# Patient Record
Sex: FEMALE | Race: WHITE | Employment: FULL TIME | URBAN - METROPOLITAN AREA
[De-identification: names, ages, dates, MRNs, and addresses within clinical notes are randomized per-mention and may not be internally consistent; named-entity substitution may affect disease eponyms.]

---

## 2017-05-13 ENCOUNTER — APPOINTMENT (OUTPATIENT)
Dept: LAB | Age: 52
End: 2017-05-13
Attending: INTERNAL MEDICINE
Payer: COMMERCIAL

## 2017-05-13 DIAGNOSIS — E03.9 HYPOTHYROIDISM, UNSPECIFIED TYPE: ICD-10-CM

## 2017-05-13 DIAGNOSIS — E56.9 VITAMIN DEFICIENCY: ICD-10-CM

## 2017-05-13 PROCEDURE — 84443 ASSAY THYROID STIM HORMONE: CPT | Performed by: INTERNAL MEDICINE

## 2017-05-13 PROCEDURE — 84439 ASSAY OF FREE THYROXINE: CPT | Performed by: INTERNAL MEDICINE

## 2017-05-13 PROCEDURE — 82306 VITAMIN D 25 HYDROXY: CPT | Performed by: INTERNAL MEDICINE

## 2017-05-13 PROCEDURE — 36415 COLL VENOUS BLD VENIPUNCTURE: CPT | Performed by: INTERNAL MEDICINE

## 2017-06-01 ENCOUNTER — TELEPHONE (OUTPATIENT)
Dept: FAMILY MEDICINE CLINIC | Facility: CLINIC | Age: 52
End: 2017-06-01

## 2017-06-02 NOTE — TELEPHONE ENCOUNTER
Pt called back returning a call for the nurse to discuss her lab test results. Please call and advise.

## 2017-10-10 ENCOUNTER — TELEPHONE (OUTPATIENT)
Dept: FAMILY MEDICINE CLINIC | Facility: CLINIC | Age: 52
End: 2017-10-10

## 2017-10-10 DIAGNOSIS — Z12.39 SCREENING BREAST EXAMINATION: Primary | ICD-10-CM

## 2017-10-10 NOTE — TELEPHONE ENCOUNTER
Wants mamm order put in system before physical appt, lives near [facility] and wants to get everything done the same day, please call

## 2017-10-11 ENCOUNTER — TELEPHONE (OUTPATIENT)
Dept: FAMILY MEDICINE CLINIC | Facility: CLINIC | Age: 52
End: 2017-10-11

## 2017-10-11 DIAGNOSIS — R73.09 ELEVATED HEMOGLOBIN A1C: ICD-10-CM

## 2017-10-11 DIAGNOSIS — E78.2 ELEVATED TRIGLYCERIDES WITH HIGH CHOLESTEROL: Primary | ICD-10-CM

## 2017-10-11 NOTE — TELEPHONE ENCOUNTER
Spoke to patient about labs. Informed her to take fish oil 1000mg BID. Watch carbs and increase exercise. Repeat labs in 8 weeks. Patient stated understanding and will follow plan. Ordered placed for repeat labs.

## 2017-12-30 ENCOUNTER — APPOINTMENT (OUTPATIENT)
Dept: LAB | Age: 52
End: 2017-12-30
Attending: FAMILY MEDICINE
Payer: COMMERCIAL

## 2017-12-30 ENCOUNTER — HOSPITAL ENCOUNTER (OUTPATIENT)
Dept: MAMMOGRAPHY | Age: 52
Discharge: HOME | End: 2017-12-30
Attending: FAMILY MEDICINE
Payer: COMMERCIAL

## 2017-12-30 ENCOUNTER — OFFICE VISIT (OUTPATIENT)
Dept: FAMILY MEDICINE CLINIC | Facility: CLINIC | Age: 52
End: 2017-12-30

## 2017-12-30 VITALS
HEART RATE: 78 BPM | WEIGHT: 128 LBS | BODY MASS INDEX: 25.8 KG/M2 | TEMPERATURE: 98 F | DIASTOLIC BLOOD PRESSURE: 82 MMHG | OXYGEN SATURATION: 98 % | HEIGHT: 59 IN | SYSTOLIC BLOOD PRESSURE: 124 MMHG | RESPIRATION RATE: 18 BRPM

## 2017-12-30 DIAGNOSIS — Z12.39 SCREENING BREAST EXAMINATION: ICD-10-CM

## 2017-12-30 DIAGNOSIS — Z78.0 POST-MENOPAUSAL: ICD-10-CM

## 2017-12-30 DIAGNOSIS — K51.919 ULCERATIVE COLITIS WITH COMPLICATION, UNSPECIFIED LOCATION (HCC): ICD-10-CM

## 2017-12-30 DIAGNOSIS — E78.2 ELEVATED TRIGLYCERIDES WITH HIGH CHOLESTEROL: ICD-10-CM

## 2017-12-30 DIAGNOSIS — Z00.00 ANNUAL PHYSICAL EXAM: Primary | ICD-10-CM

## 2017-12-30 DIAGNOSIS — N88.9 ABNORMAL CERVIX FINDING: ICD-10-CM

## 2017-12-30 DIAGNOSIS — E78.2 MIXED HYPERLIPIDEMIA: ICD-10-CM

## 2017-12-30 DIAGNOSIS — Z01.419 WELL WOMAN EXAM WITH ROUTINE GYNECOLOGICAL EXAM: ICD-10-CM

## 2017-12-30 DIAGNOSIS — R73.03 PREDIABETES: ICD-10-CM

## 2017-12-30 DIAGNOSIS — R73.09 ELEVATED HEMOGLOBIN A1C: ICD-10-CM

## 2017-12-30 DIAGNOSIS — D22.39 MELANOCYTIC NEVUS OF FACE, OTHER LOCATION: ICD-10-CM

## 2017-12-30 LAB
ALBUMIN SERPL-MCNC: 3.7 G/DL (ref 3.5–4.8)
ALP LIVER SERPL-CCNC: 86 U/L (ref 41–108)
ALT SERPL-CCNC: 15 U/L (ref 14–54)
AST SERPL-CCNC: 16 U/L (ref 15–41)
BILIRUB SERPL-MCNC: 0.3 MG/DL (ref 0.1–2)
BUN BLD-MCNC: 20 MG/DL (ref 8–20)
CALCIUM BLD-MCNC: 9.1 MG/DL (ref 8.3–10.3)
CHLORIDE: 107 MMOL/L (ref 101–111)
CHOLEST SMN-MCNC: 242 MG/DL (ref ?–200)
CO2: 29 MMOL/L (ref 22–32)
CREAT BLD-MCNC: 0.72 MG/DL (ref 0.55–1.02)
EST. AVERAGE GLUCOSE BLD GHB EST-MCNC: 120 MG/DL (ref 68–126)
GLUCOSE BLD-MCNC: 89 MG/DL (ref 70–99)
HAV IGM SER QL: 2.3 MG/DL (ref 1.7–3)
HBA1C MFR BLD HPLC: 5.8 % (ref ?–5.7)
HDLC SERPL-MCNC: 49 MG/DL (ref 45–?)
HDLC SERPL: 4.94 {RATIO} (ref ?–4.44)
LDLC SERPL CALC-MCNC: 167 MG/DL (ref ?–130)
M PROTEIN MFR SERPL ELPH: 7.5 G/DL (ref 6.1–8.3)
NONHDLC SERPL-MCNC: 193 MG/DL (ref ?–130)
POTASSIUM SERPL-SCNC: 3.9 MMOL/L (ref 3.6–5.1)
SODIUM SERPL-SCNC: 144 MMOL/L (ref 136–144)
TRIGL SERPL-MCNC: 130 MG/DL (ref ?–150)
VLDLC SERPL CALC-MCNC: 26 MG/DL (ref 5–40)

## 2017-12-30 PROCEDURE — 87624 HPV HI-RISK TYP POOLED RSLT: CPT | Performed by: FAMILY MEDICINE

## 2017-12-30 PROCEDURE — 99396 PREV VISIT EST AGE 40-64: CPT | Performed by: FAMILY MEDICINE

## 2017-12-30 PROCEDURE — 99212 OFFICE O/P EST SF 10 MIN: CPT | Performed by: FAMILY MEDICINE

## 2017-12-30 PROCEDURE — 83735 ASSAY OF MAGNESIUM: CPT | Performed by: FAMILY MEDICINE

## 2017-12-30 PROCEDURE — 77063 BREAST TOMOSYNTHESIS BI: CPT | Performed by: FAMILY MEDICINE

## 2017-12-30 PROCEDURE — 83036 HEMOGLOBIN GLYCOSYLATED A1C: CPT | Performed by: FAMILY MEDICINE

## 2017-12-30 PROCEDURE — 80061 LIPID PANEL: CPT | Performed by: FAMILY MEDICINE

## 2017-12-30 PROCEDURE — 80053 COMPREHEN METABOLIC PANEL: CPT | Performed by: FAMILY MEDICINE

## 2017-12-30 PROCEDURE — 88175 CYTOPATH C/V AUTO FLUID REDO: CPT | Performed by: FAMILY MEDICINE

## 2017-12-30 PROCEDURE — 77067 SCR MAMMO BI INCL CAD: CPT | Performed by: FAMILY MEDICINE

## 2017-12-30 PROCEDURE — 36415 COLL VENOUS BLD VENIPUNCTURE: CPT | Performed by: FAMILY MEDICINE

## 2017-12-30 RX ORDER — CHLORAL HYDRATE 500 MG
1000 CAPSULE ORAL DAILY
COMMUNITY
End: 2021-06-10 | Stop reason: ALTCHOICE

## 2018-01-02 LAB — HPV I/H RISK 1 DNA SPEC QL NAA+PROBE: NEGATIVE

## 2018-01-10 NOTE — H&P
Henry Ghotra is a 46year old female who presents for a well woman physical exam:       Patient complains of:  Nevus on face. Blood test showing prediabetes, and hyperlipidemia. Hx of UC. Not currently with diarrhea or hematochezia.       Cur up to date    Osteoperosis Prevention was discussed. Reviewed Calcium, Vitamin D supplementation and Weight Bearing Exercises. Patient is not currently taking calcium and Vitamin D supplementation.         REVIEW OF SYSTEMS:   GENERAL: feels well otherw 3    4. Post-menopausal  - XR DEXA BONE DENSITOMETRY (CPT=77080); Future    5. Ulcerative colitis with complication, unspecified location ([facility] [address]  - MAGNESIUM; Future    6. Melanocytic nevus of face, other location  - DERM - INTERNAL    7.  Abnormal cervix fi

## 2018-02-06 ENCOUNTER — TELEPHONE (OUTPATIENT)
Dept: OBGYN CLINIC | Facility: CLINIC | Age: 53
End: 2018-02-06

## 2018-02-06 NOTE — TELEPHONE ENCOUNTER
Its not because of the pap smear--that is normal.  Dr. Racquel Girard thinks something is on the cervix.   Just needs new pt appt

## 2018-02-06 NOTE — TELEPHONE ENCOUNTER
She was a pt of yours at [facility] for health. Her pcp wants her to see ob/gyne because of her pap result. Please review  result and let PSR know what type of appt she needs.   Thanks

## 2018-02-06 NOTE — TELEPHONE ENCOUNTER
Advised pt on her previously schedule appt with Dr. Chidi Tamayo on 2/16/18. Pt states she forgot she had that. Will keep that appt.

## 2018-02-16 ENCOUNTER — OFFICE VISIT (OUTPATIENT)
Dept: OBGYN CLINIC | Facility: CLINIC | Age: 53
End: 2018-02-16

## 2018-02-16 VITALS
WEIGHT: 124 LBS | SYSTOLIC BLOOD PRESSURE: 116 MMHG | HEIGHT: 60.25 IN | BODY MASS INDEX: 24.03 KG/M2 | DIASTOLIC BLOOD PRESSURE: 68 MMHG | HEART RATE: 73 BPM

## 2018-02-16 DIAGNOSIS — D26.0: Primary | ICD-10-CM

## 2018-02-16 PROCEDURE — 99203 OFFICE O/P NEW LOW 30 MIN: CPT | Performed by: OBSTETRICS & GYNECOLOGY

## 2018-02-16 NOTE — PROGRESS NOTES
Menopausal fo 'many years'    PCP found lesion on cervix, normal PAP 17    ROS: No Cardiac, Respiratory, GI,  or Neurological symptoms.     PE:  Abdomen soft, no masses, non-tender  Pelvic:External vag normal, cervix endocervical polyp covers os

## 2019-10-25 ENCOUNTER — TELEPHONE (OUTPATIENT)
Dept: FAMILY MEDICINE CLINIC | Facility: CLINIC | Age: 54
End: 2019-10-25

## 2019-10-25 NOTE — TELEPHONE ENCOUNTER
Was in ER today and had an EKG and it came out Abnormal. ( She moved to [place])    Is there a way to look and see if she ever had and EKG here? She wants us to fax it to the hospital that she is at right now.  ([address] )    Bassem Lmyan

## 2021-06-10 ENCOUNTER — OFFICE VISIT (OUTPATIENT)
Dept: FAMILY MEDICINE CLINIC | Facility: CLINIC | Age: 56
End: 2021-06-10
Payer: COMMERCIAL

## 2021-06-10 VITALS
OXYGEN SATURATION: 99 % | BODY MASS INDEX: 25.52 KG/M2 | HEIGHT: 60 IN | HEART RATE: 78 BPM | RESPIRATION RATE: 18 BRPM | TEMPERATURE: 98 F | WEIGHT: 130 LBS | DIASTOLIC BLOOD PRESSURE: 76 MMHG | SYSTOLIC BLOOD PRESSURE: 124 MMHG

## 2021-06-10 DIAGNOSIS — Z00.00 ROUTINE GENERAL MEDICAL EXAMINATION AT A HEALTH CARE FACILITY: Primary | ICD-10-CM

## 2021-06-10 DIAGNOSIS — N89.8 VAGINAL DISCHARGE: ICD-10-CM

## 2021-06-10 DIAGNOSIS — Z12.4 CERVICAL CANCER SCREENING: ICD-10-CM

## 2021-06-10 DIAGNOSIS — Z13.1 SCREENING FOR DIABETES MELLITUS (DM): ICD-10-CM

## 2021-06-10 DIAGNOSIS — K51.80 OTHER ULCERATIVE COLITIS WITHOUT COMPLICATION (HCC): ICD-10-CM

## 2021-06-10 DIAGNOSIS — M79.89 LEG SWELLING: ICD-10-CM

## 2021-06-10 DIAGNOSIS — Z80.3 FAMILY HISTORY OF BREAST CANCER IN MOTHER: ICD-10-CM

## 2021-06-10 PROCEDURE — 3008F BODY MASS INDEX DOCD: CPT | Performed by: PHYSICIAN ASSISTANT

## 2021-06-10 PROCEDURE — 88175 CYTOPATH C/V AUTO FLUID REDO: CPT | Performed by: PHYSICIAN ASSISTANT

## 2021-06-10 PROCEDURE — 3074F SYST BP LT 130 MM HG: CPT | Performed by: PHYSICIAN ASSISTANT

## 2021-06-10 PROCEDURE — 87624 HPV HI-RISK TYP POOLED RSLT: CPT | Performed by: PHYSICIAN ASSISTANT

## 2021-06-10 PROCEDURE — 99386 PREV VISIT NEW AGE 40-64: CPT | Performed by: PHYSICIAN ASSISTANT

## 2021-06-10 PROCEDURE — 3078F DIAST BP <80 MM HG: CPT | Performed by: PHYSICIAN ASSISTANT

## 2021-06-10 RX ORDER — CLOTRIMAZOLE AND BETAMETHASONE DIPROPIONATE 10; .64 MG/G; MG/G
1 CREAM TOPICAL 2 TIMES DAILY PRN
Qty: 60 G | Refills: 3 | Status: SHIPPED | OUTPATIENT
Start: 2021-06-10

## 2021-06-10 NOTE — PROGRESS NOTES
HPI/Subjective:   Patient ID: Alden Love is a 54year old female. HPI   Patient presents today requesting physical exam. She is a patient of Dr. Sergo Kendrick but for the last 3 years was living in [place]. She recently moved back a month ago. nervous/anxious. Current Outpatient Medications   Medication Sig Dispense Refill   • clotrimazole-betamethasone 1-0.05 % External Cream Apply 1 Application topically 2 (two) times daily as needed.  60 g 3     Allergies:No Known Allergies    Objective: motion and neck supple. Lymphadenopathy:      Cervical: No cervical adenopathy. Skin:     General: Skin is warm and dry. Neurological:      General: No focal deficit present. Mental Status: She is alert and oriented to person, place, and time. times daily as needed.        Imaging & Referrals:  None

## 2021-06-18 ENCOUNTER — TELEPHONE (OUTPATIENT)
Dept: FAMILY MEDICINE CLINIC | Facility: CLINIC | Age: 56
End: 2021-06-18

## 2021-06-18 DIAGNOSIS — M25.40 SWELLING OF JOINT: Primary | ICD-10-CM

## 2021-06-18 NOTE — TELEPHONE ENCOUNTER
I have put in orders to check for autoimmune disorders such as RA/lupus. She should elevate the lower extremity to help reduce swelling. We should avoid NSAIDs given her history of ulcerative colitis. She can take Tylenol as needed for achiness or pain.

## 2021-06-18 NOTE — TELEPHONE ENCOUNTER
Right foot:   Feels cold, she can feel sensation when she touches foot, some feeling of numbness when not touching, foot is not pale/blue, describes as redness on bottom, blanches, cap refill is right away.    She states the right leg, right arm swelling, i

## 2021-06-18 NOTE — TELEPHONE ENCOUNTER
Patient was seen on 6/10 and was discussed that her right side arm, leg, ankles were swollen. Patient now states that yesterday foot felt achy and today feels cold and is still swollen. Please advise.

## 2021-06-18 NOTE — TELEPHONE ENCOUNTER
Patient notified, verbalized understanding.    States she is taking Tylenol, elevating legs, been tested for Lupus in the past. Signed a verbal release for Dr. Marlo Goltz [place] [id number] [facility] to send all records, have not been received she signed a medical release when she was

## 2021-06-19 ENCOUNTER — LAB ENCOUNTER (OUTPATIENT)
Dept: LAB | Facility: HOSPITAL | Age: 56
End: 2021-06-19
Attending: PHYSICIAN ASSISTANT
Payer: COMMERCIAL

## 2021-06-19 DIAGNOSIS — Z00.00 ROUTINE GENERAL MEDICAL EXAMINATION AT A HEALTH CARE FACILITY: ICD-10-CM

## 2021-06-19 DIAGNOSIS — M79.89 LEG SWELLING: ICD-10-CM

## 2021-06-19 DIAGNOSIS — M25.40 SWELLING OF JOINT: ICD-10-CM

## 2021-06-19 DIAGNOSIS — Z13.1 SCREENING FOR DIABETES MELLITUS (DM): ICD-10-CM

## 2021-06-19 PROCEDURE — 36415 COLL VENOUS BLD VENIPUNCTURE: CPT

## 2021-06-19 PROCEDURE — 80053 COMPREHEN METABOLIC PANEL: CPT

## 2021-06-19 PROCEDURE — 83036 HEMOGLOBIN GLYCOSYLATED A1C: CPT

## 2021-06-19 PROCEDURE — 85025 COMPLETE CBC W/AUTO DIFF WBC: CPT

## 2021-06-19 PROCEDURE — 80061 LIPID PANEL: CPT

## 2021-06-19 PROCEDURE — 82306 VITAMIN D 25 HYDROXY: CPT

## 2021-06-19 PROCEDURE — 82607 VITAMIN B-12: CPT

## 2021-06-19 PROCEDURE — 84443 ASSAY THYROID STIM HORMONE: CPT

## 2021-06-19 PROCEDURE — 86431 RHEUMATOID FACTOR QUANT: CPT

## 2021-06-19 PROCEDURE — 86038 ANTINUCLEAR ANTIBODIES: CPT

## 2021-06-19 PROCEDURE — 86140 C-REACTIVE PROTEIN: CPT

## 2021-06-19 PROCEDURE — 85379 FIBRIN DEGRADATION QUANT: CPT

## 2021-06-23 ENCOUNTER — TELEPHONE (OUTPATIENT)
Dept: FAMILY MEDICINE CLINIC | Facility: CLINIC | Age: 56
End: 2021-06-23

## 2021-06-23 NOTE — TELEPHONE ENCOUNTER
We can hold off on starting a statin at this point. If she had abnormal CT cardiac calcium score then that is good news. Her ASCVD 10-year risk score is 3%.   We will continue to monitor lipids for now but she should try to maintain a heart healthy low-fa

## 2021-06-23 NOTE — TELEPHONE ENCOUNTER
Reviewed results note from [facility] with patient,   She states she saw cards in [place] and they advised statin not needed after CT calcium score was reviewed, this was 6 months ago (I was able to find records release paperwork signed by patient on 6/10/21, faxe

## 2021-06-23 NOTE — TELEPHONE ENCOUNTER
Pt has viewed her lab results and has some concerns she would like to discuss with nurse. She also wants to discuss her foot problem and since last visit it has worsened and \"is very cold\".   Pt also frustrated that we have not received records from prev

## 2021-07-29 ENCOUNTER — OFFICE VISIT (OUTPATIENT)
Dept: FAMILY MEDICINE CLINIC | Facility: CLINIC | Age: 56
End: 2021-07-29
Payer: COMMERCIAL

## 2021-07-29 VITALS
SYSTOLIC BLOOD PRESSURE: 122 MMHG | OXYGEN SATURATION: 98 % | TEMPERATURE: 97 F | RESPIRATION RATE: 20 BRPM | DIASTOLIC BLOOD PRESSURE: 80 MMHG | HEIGHT: 60 IN | BODY MASS INDEX: 25.72 KG/M2 | HEART RATE: 65 BPM | WEIGHT: 131 LBS

## 2021-07-29 DIAGNOSIS — R25.2 LEG CRAMP: ICD-10-CM

## 2021-07-29 DIAGNOSIS — I73.00 RAYNAUD'S DISEASE WITHOUT GANGRENE: Primary | ICD-10-CM

## 2021-07-29 DIAGNOSIS — E78.2 MIXED HYPERLIPIDEMIA: ICD-10-CM

## 2021-07-29 PROCEDURE — 3008F BODY MASS INDEX DOCD: CPT | Performed by: FAMILY MEDICINE

## 2021-07-29 PROCEDURE — 3074F SYST BP LT 130 MM HG: CPT | Performed by: FAMILY MEDICINE

## 2021-07-29 PROCEDURE — 99214 OFFICE O/P EST MOD 30 MIN: CPT | Performed by: FAMILY MEDICINE

## 2021-07-29 PROCEDURE — 3079F DIAST BP 80-89 MM HG: CPT | Performed by: FAMILY MEDICINE

## 2021-07-29 RX ORDER — ATORVASTATIN CALCIUM 20 MG/1
20 TABLET, FILM COATED ORAL NIGHTLY
Qty: 90 TABLET | Refills: 1 | Status: SHIPPED | OUTPATIENT
Start: 2021-07-29

## 2021-07-29 RX ORDER — ASPIRIN 81 MG/1
81 TABLET ORAL DAILY
Qty: 90 TABLET | Refills: 1 | Status: SHIPPED | OUTPATIENT
Start: 2021-07-29

## 2021-07-29 RX ORDER — NIFEDIPINE 30 MG/1
30 TABLET, FILM COATED, EXTENDED RELEASE ORAL DAILY
Qty: 90 TABLET | Refills: 1 | Status: SHIPPED | OUTPATIENT
Start: 2021-07-29

## 2021-07-30 NOTE — PROGRESS NOTES
HPI/Subjective:   Patient ID: Sabiha Almanza is a 54year old female. For about 1 year has been having cold sensation, swelling, and intermittent numbness and mild pain in right arm and right foot. Worsening. No treatment tried no relief.   Otilia Jaquez Pulmonary:      Effort: Pulmonary effort is normal. No respiratory distress. Breath sounds: Normal breath sounds. No wheezing or rales. Musculoskeletal:         General: No tenderness.    Skin:     Comments: extremities do not feel colder on right

## 2021-08-03 ENCOUNTER — TELEPHONE (OUTPATIENT)
Dept: FAMILY MEDICINE CLINIC | Facility: CLINIC | Age: 56
End: 2021-08-03

## 2021-08-03 NOTE — TELEPHONE ENCOUNTER
Patient was seen last week and Dr. Davonna Schirmer had prescribed 3 medications. NIFEdipine ER 30 MG Oral Tablet 24 Hr  atorvastatin 20 MG Oral Tab  aspirin 81 MG Oral Tab EC    She states that she takes all 3 at the same time.   She believes that the Nifedipine i

## 2021-08-03 NOTE — TELEPHONE ENCOUNTER
Spoke with patient:   She states she has taken the Nifedipine for 3 days, she feels loopy after taking medication and has had a migraine each day.     Last night she went to bed at 6pm with migraine, did not take anything for pain, just sleep, doing better

## 2021-08-04 RX ORDER — AMLODIPINE BESYLATE 2.5 MG/1
2.5 TABLET ORAL DAILY
Qty: 30 TABLET | Refills: 0 | Status: SHIPPED | OUTPATIENT
Start: 2021-08-04

## 2021-11-22 ENCOUNTER — NURSE ONLY (OUTPATIENT)
Dept: FAMILY MEDICINE CLINIC | Facility: CLINIC | Age: 56
End: 2021-11-22
Payer: COMMERCIAL

## 2021-11-22 PROCEDURE — 90471 IMMUNIZATION ADMIN: CPT | Performed by: FAMILY MEDICINE

## 2021-11-22 PROCEDURE — 90686 IIV4 VACC NO PRSV 0.5 ML IM: CPT | Performed by: FAMILY MEDICINE

## 2021-11-30 ENCOUNTER — TELEPHONE (OUTPATIENT)
Dept: FAMILY MEDICINE CLINIC | Facility: CLINIC | Age: 56
End: 2021-11-30

## 2021-11-30 DIAGNOSIS — Z12.31 ENCOUNTER FOR SCREENING MAMMOGRAM FOR BREAST CANCER: Primary | ICD-10-CM

## 2022-01-29 ENCOUNTER — HOSPITAL ENCOUNTER (OUTPATIENT)
Dept: MAMMOGRAPHY | Age: 57
Discharge: HOME | End: 2022-01-29
Attending: PHYSICIAN ASSISTANT
Payer: COMMERCIAL

## 2022-01-29 ENCOUNTER — MOBILE ENCOUNTER (OUTPATIENT)
Dept: FAMILY MEDICINE CLINIC | Facility: CLINIC | Age: 57
End: 2022-01-29

## 2022-01-29 DIAGNOSIS — Z12.31 ENCOUNTER FOR SCREENING MAMMOGRAM FOR BREAST CANCER: ICD-10-CM

## 2022-01-30 NOTE — PROGRESS NOTES
Home phone call received at 11:45 PM on January 29, 2022. Patient received Covid booster this morning. Is having arm pain at the injection site, abdominal discomfort, myalgias, and headache all over. She does have a history of colitis.   We discussed the

## 2022-02-26 ENCOUNTER — HOSPITAL ENCOUNTER (OUTPATIENT)
Dept: MAMMOGRAPHY | Age: 57
Discharge: HOME | End: 2022-02-26
Attending: PHYSICIAN ASSISTANT
Payer: COMMERCIAL

## 2022-02-26 DIAGNOSIS — Z12.31 ENCOUNTER FOR SCREENING MAMMOGRAM FOR BREAST CANCER: ICD-10-CM

## 2022-03-16 ENCOUNTER — APPOINTMENT (OUTPATIENT)
Dept: MRI IMAGING | Facility: HOSPITAL | Age: 57
End: 2022-03-16
Attending: STUDENT IN AN ORGANIZED HEALTH CARE EDUCATION/TRAINING PROGRAM
Payer: COMMERCIAL

## 2022-03-16 ENCOUNTER — HOSPITAL ENCOUNTER (OUTPATIENT)
Age: 57
Discharge: ANOTHER HOSPITAL | End: 2022-03-16
Attending: EMERGENCY MEDICINE
Payer: COMMERCIAL

## 2022-03-16 ENCOUNTER — HOSPITAL ENCOUNTER (EMERGENCY)
Facility: HOSPITAL | Age: 57
Discharge: HOME | End: 2022-03-16
Attending: STUDENT IN AN ORGANIZED HEALTH CARE EDUCATION/TRAINING PROGRAM
Payer: COMMERCIAL

## 2022-03-16 VITALS
TEMPERATURE: 98 F | HEART RATE: 84 BPM | HEIGHT: 60 IN | DIASTOLIC BLOOD PRESSURE: 71 MMHG | SYSTOLIC BLOOD PRESSURE: 117 MMHG | OXYGEN SATURATION: 99 % | RESPIRATION RATE: 18 BRPM | WEIGHT: 145 LBS | BODY MASS INDEX: 28.47 KG/M2

## 2022-03-16 VITALS
BODY MASS INDEX: 25.91 KG/M2 | SYSTOLIC BLOOD PRESSURE: 143 MMHG | WEIGHT: 132 LBS | TEMPERATURE: 98 F | DIASTOLIC BLOOD PRESSURE: 70 MMHG | HEIGHT: 60 IN | RESPIRATION RATE: 18 BRPM | OXYGEN SATURATION: 98 % | HEART RATE: 70 BPM

## 2022-03-16 DIAGNOSIS — R13.10 DYSPHAGIA, UNSPECIFIED TYPE: ICD-10-CM

## 2022-03-16 DIAGNOSIS — R53.1 RIGHT SIDED WEAKNESS: Primary | ICD-10-CM

## 2022-03-16 DIAGNOSIS — R42 DIZZINESS: Primary | ICD-10-CM

## 2022-03-16 DIAGNOSIS — R29.898 ARM HEAVINESS: ICD-10-CM

## 2022-03-16 LAB
ALBUMIN SERPL-MCNC: 4.1 G/DL (ref 3.4–5)
ALBUMIN/GLOB SERPL: 1 {RATIO} (ref 1–2)
ALP LIVER SERPL-CCNC: 95 U/L
ALT SERPL-CCNC: 19 U/L
ANION GAP SERPL CALC-SCNC: 2 MMOL/L (ref 0–18)
AST SERPL-CCNC: 22 U/L (ref 15–37)
ATRIAL RATE: 62 BPM
ATRIAL RATE: 63 BPM
BASOPHILS # BLD AUTO: 0.03 X10(3) UL (ref 0–0.2)
BASOPHILS NFR BLD AUTO: 0.4 %
BILIRUB SERPL-MCNC: 0.4 MG/DL (ref 0.1–2)
BILIRUB UR QL STRIP.AUTO: NEGATIVE
BUN BLD-MCNC: 10 MG/DL (ref 7–18)
CHLORIDE SERPL-SCNC: 105 MMOL/L (ref 98–112)
CLARITY UR REFRACT.AUTO: CLEAR
CO2 SERPL-SCNC: 32 MMOL/L (ref 21–32)
CREAT BLD-MCNC: 0.75 MG/DL
EOSINOPHIL # BLD AUTO: 0.18 X10(3) UL (ref 0–0.7)
EOSINOPHIL NFR BLD AUTO: 2.2 %
ERYTHROCYTE [DISTWIDTH] IN BLOOD BY AUTOMATED COUNT: 12.8 %
GLOBULIN PLAS-MCNC: 4.1 G/DL (ref 2.8–4.4)
GLUCOSE BLD-MCNC: 91 MG/DL (ref 70–99)
GLUCOSE BLD-MCNC: 98 MG/DL (ref 70–99)
HCT VFR BLD AUTO: 46.4 %
HGB BLD-MCNC: 15.2 G/DL
IMM GRANULOCYTES # BLD AUTO: 0.02 X10(3) UL (ref 0–1)
IMM GRANULOCYTES NFR BLD: 0.2 %
KETONES UR STRIP.AUTO-MCNC: NEGATIVE MG/DL
LEUKOCYTE ESTERASE UR QL STRIP.AUTO: NEGATIVE
LYMPHOCYTES # BLD AUTO: 2.71 X10(3) UL (ref 1–4)
LYMPHOCYTES NFR BLD AUTO: 32.4 %
MCH RBC QN AUTO: 29.2 PG (ref 26–34)
MCHC RBC AUTO-ENTMCNC: 32.8 G/DL (ref 31–37)
MCV RBC AUTO: 89.2 FL
MONOCYTES # BLD AUTO: 0.57 X10(3) UL (ref 0.1–1)
MONOCYTES NFR BLD AUTO: 6.8 %
NEUTROPHILS # BLD AUTO: 4.85 X10 (3) UL (ref 1.5–7.7)
NEUTROPHILS # BLD AUTO: 4.85 X10(3) UL (ref 1.5–7.7)
NEUTROPHILS NFR BLD AUTO: 58 %
NITRITE UR QL STRIP.AUTO: NEGATIVE
OSMOLALITY SERPL CALC.SUM OF ELEC: 287 MOSM/KG (ref 275–295)
P AXIS: 49 DEGREES
P AXIS: 55 DEGREES
P-R INTERVAL: 120 MS
P-R INTERVAL: 138 MS
PH UR STRIP.AUTO: 7 [PH] (ref 5–8)
POTASSIUM SERPL-SCNC: 3.7 MMOL/L (ref 3.5–5.1)
PROT SERPL-MCNC: 8.2 G/DL (ref 6.4–8.2)
PROT UR STRIP.AUTO-MCNC: NEGATIVE MG/DL
Q-T INTERVAL: 386 MS
Q-T INTERVAL: 402 MS
QRS DURATION: 86 MS
QRS DURATION: 88 MS
QTC CALCULATION (BEZET): 395 MS
QTC CALCULATION (BEZET): 408 MS
R AXIS: 40 DEGREES
R AXIS: 52 DEGREES
RBC # BLD AUTO: 5.2 X10(6)UL
SARS-COV-2 RNA RESP QL NAA+PROBE: NOT DETECTED
SODIUM SERPL-SCNC: 139 MMOL/L (ref 136–145)
SP GR UR STRIP.AUTO: 1.01 (ref 1–1.03)
T AXIS: 18 DEGREES
TROPONIN I HIGH SENSITIVITY: 3 NG/L
UROBILINOGEN UR STRIP.AUTO-MCNC: <2 MG/DL
VENTRICULAR RATE: 62 BPM
VENTRICULAR RATE: 63 BPM
WBC # BLD AUTO: 8.4 X10(3) UL (ref 4–11)

## 2022-03-16 PROCEDURE — 70549 MR ANGIOGRAPH NECK W/O&W/DYE: CPT | Performed by: STUDENT IN AN ORGANIZED HEALTH CARE EDUCATION/TRAINING PROGRAM

## 2022-03-16 PROCEDURE — 84484 ASSAY OF TROPONIN QUANT: CPT | Performed by: STUDENT IN AN ORGANIZED HEALTH CARE EDUCATION/TRAINING PROGRAM

## 2022-03-16 PROCEDURE — A9575 INJ GADOTERATE MEGLUMI 0.1ML: HCPCS | Performed by: STUDENT IN AN ORGANIZED HEALTH CARE EDUCATION/TRAINING PROGRAM

## 2022-03-16 PROCEDURE — 70546 MR ANGIOGRAPH HEAD W/O&W/DYE: CPT | Performed by: STUDENT IN AN ORGANIZED HEALTH CARE EDUCATION/TRAINING PROGRAM

## 2022-03-16 PROCEDURE — 99285 EMERGENCY DEPT VISIT HI MDM: CPT

## 2022-03-16 PROCEDURE — 82962 GLUCOSE BLOOD TEST: CPT

## 2022-03-16 PROCEDURE — 70553 MRI BRAIN STEM W/O & W/DYE: CPT | Performed by: STUDENT IN AN ORGANIZED HEALTH CARE EDUCATION/TRAINING PROGRAM

## 2022-03-16 PROCEDURE — 84484 ASSAY OF TROPONIN QUANT: CPT

## 2022-03-16 PROCEDURE — 85025 COMPLETE CBC W/AUTO DIFF WBC: CPT | Performed by: STUDENT IN AN ORGANIZED HEALTH CARE EDUCATION/TRAINING PROGRAM

## 2022-03-16 PROCEDURE — 85025 COMPLETE CBC W/AUTO DIFF WBC: CPT

## 2022-03-16 PROCEDURE — 93005 ELECTROCARDIOGRAM TRACING: CPT

## 2022-03-16 PROCEDURE — 93010 ELECTROCARDIOGRAM REPORT: CPT

## 2022-03-16 PROCEDURE — 81001 URINALYSIS AUTO W/SCOPE: CPT | Performed by: STUDENT IN AN ORGANIZED HEALTH CARE EDUCATION/TRAINING PROGRAM

## 2022-03-16 PROCEDURE — 99214 OFFICE O/P EST MOD 30 MIN: CPT

## 2022-03-16 PROCEDURE — 99284 EMERGENCY DEPT VISIT MOD MDM: CPT

## 2022-03-16 PROCEDURE — 36415 COLL VENOUS BLD VENIPUNCTURE: CPT

## 2022-03-16 PROCEDURE — 80053 COMPREHEN METABOLIC PANEL: CPT | Performed by: STUDENT IN AN ORGANIZED HEALTH CARE EDUCATION/TRAINING PROGRAM

## 2022-03-16 PROCEDURE — 80053 COMPREHEN METABOLIC PANEL: CPT

## 2022-03-16 NOTE — ED INITIAL ASSESSMENT (HPI)
Patient states that her right arm feels heavy, also reports some right sided neck pain and back pain. Every time that she swallows she feels like her food gets stuck. Also reports dizziness that started 2 days ago, and yesterday felt that she was leaning to the right side when she was walking.

## 2022-03-16 NOTE — ED INITIAL ASSESSMENT (HPI)
Patient here for multiple complaints over the last 3 days. Pt states trouble swallowing and feeling like \"food gets stuck. \"  Pt able to tolerate liquids. Pt reports \"feeling like a bobble head doll. \"  Pt states right arm heaviness. Pt normal mentation and steady gait.   Pt was seen at the immediate care and sent here for further eval.

## 2022-03-26 ENCOUNTER — HOSPITAL ENCOUNTER (OUTPATIENT)
Dept: MAMMOGRAPHY | Age: 57
Discharge: HOME | End: 2022-03-26
Attending: PHYSICIAN ASSISTANT
Payer: COMMERCIAL

## 2022-03-26 PROCEDURE — 77067 SCR MAMMO BI INCL CAD: CPT | Performed by: PHYSICIAN ASSISTANT

## 2022-03-26 PROCEDURE — 77063 BREAST TOMOSYNTHESIS BI: CPT | Performed by: PHYSICIAN ASSISTANT

## 2022-10-13 ENCOUNTER — OFFICE VISIT (OUTPATIENT)
Dept: FAMILY MEDICINE CLINIC | Facility: CLINIC | Age: 57
End: 2022-10-13
Payer: COMMERCIAL

## 2022-10-13 VITALS
OXYGEN SATURATION: 96 % | DIASTOLIC BLOOD PRESSURE: 88 MMHG | WEIGHT: 135.19 LBS | HEART RATE: 78 BPM | RESPIRATION RATE: 16 BRPM | BODY MASS INDEX: 26.54 KG/M2 | HEIGHT: 60 IN | SYSTOLIC BLOOD PRESSURE: 118 MMHG

## 2022-10-13 DIAGNOSIS — Z00.00 ROUTINE GENERAL MEDICAL EXAMINATION AT A HEALTH CARE FACILITY: Primary | ICD-10-CM

## 2022-10-13 DIAGNOSIS — E78.2 MIXED HYPERLIPIDEMIA: ICD-10-CM

## 2022-10-13 PROCEDURE — 3079F DIAST BP 80-89 MM HG: CPT | Performed by: FAMILY MEDICINE

## 2022-10-13 PROCEDURE — 99396 PREV VISIT EST AGE 40-64: CPT | Performed by: FAMILY MEDICINE

## 2022-10-13 PROCEDURE — 3008F BODY MASS INDEX DOCD: CPT | Performed by: FAMILY MEDICINE

## 2022-10-13 PROCEDURE — 3074F SYST BP LT 130 MM HG: CPT | Performed by: FAMILY MEDICINE

## 2022-10-13 NOTE — PATIENT INSTRUCTIONS
Lifetime risk of Shingles (must have had chicken pox or have been exposed to it) is 30%. For -Americans this risk is 15%. Shingrix lowers the risk to 1.5 to 3%. The previously used Zostavax lowers it to 6% of ever getting shingles in your lifetime. Shingrix: New vaccine released in 2018. Prevents shingles 90-95% of the time. Two doses are given between 2 and 6 months apart. Side effects: Pain at injection site 70-90% of cases. Redness in almost 40%. Swelling in almost 30%. Also risk of muscle aches over 50%, fatigue over 50%, headache 50%, and fever or chills and approximately 25%. The side effects resolve in 2-3 days. Not currently covered by Medicare. Cost approximately $190 per dose at [facility]. If not received today, you may want to check with your insurance company for coverage. You can get shingles more than once and thus should be vaccinated even if you have had them before. If you elect to do this in the future, you may schedule a nurse visit if within the next 365 days. If the first dose is done today, the second dose should be scheduled as a nurse visit in 2 to 6 months. Patient information for tdap vaccine:  Tetanus bacteria causes lockjaw. You may be exposed by something gabby entering the skin or soil entering the skin. Rare in the [place], 10-20 cases per year. Vaccine may still be effective up to 72 hours after an exposure. Diphtheria causes a sore throat with white spots similar to strep. Extremely rare in the [place] but you may be exposed if you travel over the borders. Pertussis or whooping cough is most dangerous to infants and toddlers. Still see 15,000 to 20,000 cases in the [place] annually. In adults this may cause a cough lingering up to 100 days, contagious the first few weeks. Antibiotics useful in children but not effective in adults. One vaccine covers these 3 illnesses and is good for 10 years.

## 2022-10-22 ENCOUNTER — LAB ENCOUNTER (OUTPATIENT)
Dept: LAB | Age: 57
End: 2022-10-22
Attending: FAMILY MEDICINE
Payer: COMMERCIAL

## 2022-10-22 DIAGNOSIS — Z00.00 ROUTINE GENERAL MEDICAL EXAMINATION AT A HEALTH CARE FACILITY: ICD-10-CM

## 2022-10-22 DIAGNOSIS — E78.2 MIXED HYPERLIPIDEMIA: ICD-10-CM

## 2022-10-22 LAB
ALBUMIN SERPL-MCNC: 3.4 G/DL (ref 3.4–5)
ALBUMIN/GLOB SERPL: 1 {RATIO} (ref 1–2)
ALP LIVER SERPL-CCNC: 79 U/L
ALT SERPL-CCNC: 18 U/L
ANION GAP SERPL CALC-SCNC: 3 MMOL/L (ref 0–18)
AST SERPL-CCNC: 16 U/L (ref 15–37)
BILIRUB SERPL-MCNC: 0.4 MG/DL (ref 0.1–2)
BUN BLD-MCNC: 14 MG/DL (ref 7–18)
CALCIUM BLD-MCNC: 9 MG/DL (ref 8.5–10.1)
CHLORIDE SERPL-SCNC: 112 MMOL/L (ref 98–112)
CHOLEST SERPL-MCNC: 187 MG/DL (ref ?–200)
CO2 SERPL-SCNC: 28 MMOL/L (ref 21–32)
CREAT BLD-MCNC: 0.67 MG/DL
FASTING PATIENT LIPID ANSWER: YES
FASTING STATUS PATIENT QL REPORTED: YES
GFR SERPLBLD BASED ON 1.73 SQ M-ARVRAT: 103 ML/MIN/1.73M2 (ref 60–?)
GLOBULIN PLAS-MCNC: 3.5 G/DL (ref 2.8–4.4)
GLUCOSE BLD-MCNC: 104 MG/DL (ref 70–99)
HDLC SERPL-MCNC: 45 MG/DL (ref 40–59)
LDLC SERPL CALC-MCNC: 122 MG/DL (ref ?–100)
NONHDLC SERPL-MCNC: 142 MG/DL (ref ?–130)
OSMOLALITY SERPL CALC.SUM OF ELEC: 297 MOSM/KG (ref 275–295)
POTASSIUM SERPL-SCNC: 3.9 MMOL/L (ref 3.5–5.1)
PROT SERPL-MCNC: 6.9 G/DL (ref 6.4–8.2)
SODIUM SERPL-SCNC: 143 MMOL/L (ref 136–145)
TRIGL SERPL-MCNC: 113 MG/DL (ref 30–149)
VLDLC SERPL CALC-MCNC: 20 MG/DL (ref 0–30)

## 2022-10-22 PROCEDURE — 80061 LIPID PANEL: CPT | Performed by: FAMILY MEDICINE

## 2022-10-22 PROCEDURE — 80053 COMPREHEN METABOLIC PANEL: CPT | Performed by: FAMILY MEDICINE

## 2023-02-24 ENCOUNTER — TELEPHONE (OUTPATIENT)
Dept: FAMILY MEDICINE CLINIC | Facility: CLINIC | Age: 58
End: 2023-02-24

## 2023-02-24 DIAGNOSIS — Z12.31 SCREENING MAMMOGRAM FOR BREAST CANCER: Primary | ICD-10-CM

## 2023-03-01 ENCOUNTER — MOBILE ENCOUNTER (OUTPATIENT)
Dept: FAMILY MEDICINE CLINIC | Facility: CLINIC | Age: 58
End: 2023-03-01

## 2023-03-02 ENCOUNTER — HOSPITAL ENCOUNTER (EMERGENCY)
Age: 58
Discharge: HOME | End: 2023-03-02
Attending: EMERGENCY MEDICINE
Payer: COMMERCIAL

## 2023-03-02 ENCOUNTER — TELEPHONE (OUTPATIENT)
Dept: FAMILY MEDICINE CLINIC | Facility: CLINIC | Age: 58
End: 2023-03-02

## 2023-03-02 VITALS
OXYGEN SATURATION: 97 % | HEIGHT: 60 IN | BODY MASS INDEX: 25.91 KG/M2 | DIASTOLIC BLOOD PRESSURE: 72 MMHG | RESPIRATION RATE: 16 BRPM | SYSTOLIC BLOOD PRESSURE: 124 MMHG | HEART RATE: 60 BPM | TEMPERATURE: 98 F | WEIGHT: 132 LBS

## 2023-03-02 DIAGNOSIS — R13.10 DYSPHAGIA, UNSPECIFIED TYPE: Primary | ICD-10-CM

## 2023-03-02 PROCEDURE — 99283 EMERGENCY DEPT VISIT LOW MDM: CPT

## 2023-03-02 PROCEDURE — 99282 EMERGENCY DEPT VISIT SF MDM: CPT

## 2023-03-02 NOTE — PROGRESS NOTES
Home phone call received at 7:50 PM on March 1, 2023. Patient has had 2 days of sensation of food getting caught in the chest.  Does not have chronic heartburn. Denies any recent unexplained fevers night sweats or weight loss. There is no pain in her chest currently. We discussed possible acid reflux versus Schatzki's ring versus esophageal tumor. I recommended Prilosec 20 mg tonight and tomorrow morning. If not improving by t omorrow afternoon she will call and the staff can order a barium swallow to assess for ring or tumor.

## 2023-03-02 NOTE — TELEPHONE ENCOUNTER
All food would eat and sit in chest and throat, spoke with RC he advised OTC PPI. Pt took Prilosec, helped symptoms. Patient currently complaining of throat pain, wonders if this is from this or if she is getting sick, pt states she left work and wonders what to do. Advised RC has no appts and if patient wants to be seen go to [facility]. Patient agreed as she would feel better for peace of mind. She states she has a GI specialist and advised to notify him of the episode as described as above.

## 2023-03-02 NOTE — ED INITIAL ASSESSMENT (HPI)
Pt presents with burning pain to throat, nausea, and the sensation that she is not digesting her food x2 days. States spoke with her PCP over the phone who advised that she begin taking prilosec which has partly aleviated symptoms. Pt reports discomfort is better after belching.

## 2023-03-03 NOTE — PROGRESS NOTES
Consult note reviewed.  Please schedule an office visit with the first available provider and add her to the [facility] list.    Carmen Dean MD

## 2023-03-07 ENCOUNTER — OFFICE VISIT (OUTPATIENT)
Dept: FAMILY MEDICINE CLINIC | Facility: CLINIC | Age: 58
End: 2023-03-07
Payer: COMMERCIAL

## 2023-03-07 VITALS
TEMPERATURE: 98 F | DIASTOLIC BLOOD PRESSURE: 74 MMHG | WEIGHT: 132 LBS | BODY MASS INDEX: 25.91 KG/M2 | RESPIRATION RATE: 18 BRPM | HEART RATE: 73 BPM | SYSTOLIC BLOOD PRESSURE: 116 MMHG | HEIGHT: 60 IN | OXYGEN SATURATION: 98 %

## 2023-03-07 DIAGNOSIS — B97.89 VIRAL RESPIRATORY INFECTION: Primary | ICD-10-CM

## 2023-03-07 DIAGNOSIS — R09.82 POST-NASAL DRAINAGE: ICD-10-CM

## 2023-03-07 DIAGNOSIS — J98.8 VIRAL RESPIRATORY INFECTION: Primary | ICD-10-CM

## 2023-03-07 DIAGNOSIS — R05.1 ACUTE COUGH: ICD-10-CM

## 2023-03-07 PROCEDURE — 3074F SYST BP LT 130 MM HG: CPT

## 2023-03-07 PROCEDURE — 3008F BODY MASS INDEX DOCD: CPT

## 2023-03-07 PROCEDURE — 3078F DIAST BP <80 MM HG: CPT

## 2023-03-07 PROCEDURE — 99213 OFFICE O/P EST LOW 20 MIN: CPT

## 2023-03-27 ENCOUNTER — TELEPHONE (OUTPATIENT)
Dept: FAMILY MEDICINE CLINIC | Facility: CLINIC | Age: 58
End: 2023-03-27

## 2023-03-27 NOTE — TELEPHONE ENCOUNTER
Pt sched appt for next week (next opening) for cough she's had for 3 wks. She was dx with viral infection at the [facility]. Pt wants to know if this is normal to continue for 3 wks, if so she will cancel her appt next week.

## 2023-04-01 ENCOUNTER — HOSPITAL ENCOUNTER (OUTPATIENT)
Dept: MAMMOGRAPHY | Age: 58
Discharge: HOME | End: 2023-04-01
Attending: FAMILY MEDICINE
Payer: COMMERCIAL

## 2023-04-01 DIAGNOSIS — Z12.31 SCREENING MAMMOGRAM FOR BREAST CANCER: ICD-10-CM

## 2023-04-03 ENCOUNTER — OFFICE VISIT (OUTPATIENT)
Dept: FAMILY MEDICINE CLINIC | Facility: CLINIC | Age: 58
End: 2023-04-03
Payer: COMMERCIAL

## 2023-04-03 VITALS
HEIGHT: 60 IN | DIASTOLIC BLOOD PRESSURE: 72 MMHG | HEART RATE: 77 BPM | BODY MASS INDEX: 25.91 KG/M2 | OXYGEN SATURATION: 98 % | RESPIRATION RATE: 20 BRPM | SYSTOLIC BLOOD PRESSURE: 110 MMHG | WEIGHT: 132 LBS

## 2023-04-03 DIAGNOSIS — R05.8 COUGH PRESENT FOR GREATER THAN 3 WEEKS: Primary | ICD-10-CM

## 2023-04-03 DIAGNOSIS — R30.0 BURNING WITH URINATION: ICD-10-CM

## 2023-04-03 PROBLEM — J44.9 CHRONIC OBSTRUCTIVE PULMONARY DISEASE, UNSPECIFIED (HCC): Status: ACTIVE | Noted: 2023-04-03

## 2023-04-03 LAB
GLUCOSE (URINE DIPSTICK): 100 MG/DL
KETONES (URINE DIPSTICK): NEGATIVE MG/DL
LEUKOCYTES: NEGATIVE
MULTISTIX LOT#: ABNORMAL NUMERIC
NITRITE, URINE: NEGATIVE
PH, URINE: 5.5 (ref 4.5–8)
PROTEIN (URINE DIPSTICK): 30 MG/DL
SPECIFIC GRAVITY: >=1.03 (ref 1–1.03)
URINE-COLOR: YELLOW
UROBILINOGEN,SEMI-QN: 0.2 MG/DL (ref 0–1.9)

## 2023-04-03 PROCEDURE — 81003 URINALYSIS AUTO W/O SCOPE: CPT | Performed by: INTERNAL MEDICINE

## 2023-04-03 PROCEDURE — 99214 OFFICE O/P EST MOD 30 MIN: CPT | Performed by: INTERNAL MEDICINE

## 2023-04-03 PROCEDURE — 3078F DIAST BP <80 MM HG: CPT | Performed by: INTERNAL MEDICINE

## 2023-04-03 PROCEDURE — 87086 URINE CULTURE/COLONY COUNT: CPT | Performed by: INTERNAL MEDICINE

## 2023-04-03 PROCEDURE — 87147 CULTURE TYPE IMMUNOLOGIC: CPT | Performed by: INTERNAL MEDICINE

## 2023-04-03 PROCEDURE — 3008F BODY MASS INDEX DOCD: CPT | Performed by: INTERNAL MEDICINE

## 2023-04-03 PROCEDURE — 3074F SYST BP LT 130 MM HG: CPT | Performed by: INTERNAL MEDICINE

## 2023-04-03 RX ORDER — NYSTATIN 100000 U/G
CREAM TOPICAL
Qty: 30 G | Refills: 0 | Status: SHIPPED | OUTPATIENT
Start: 2023-04-03

## 2023-04-03 RX ORDER — FLUTICASONE PROPIONATE AND SALMETEROL 100; 50 UG/1; UG/1
1 POWDER RESPIRATORY (INHALATION) 2 TIMES DAILY
Qty: 1 EACH | Refills: 0 | Status: SHIPPED | OUTPATIENT
Start: 2023-04-03

## 2023-04-03 RX ORDER — AMOXICILLIN AND CLAVULANATE POTASSIUM 875; 125 MG/1; MG/1
TABLET, FILM COATED ORAL
Qty: 20 TABLET | Refills: 0 | Status: SHIPPED | OUTPATIENT
Start: 2023-04-03

## 2023-04-03 RX ORDER — GUAIFENESIN AND CODEINE PHOSPHATE 100; 10 MG/5ML; MG/5ML
SOLUTION ORAL
Qty: 236 ML | Refills: 0 | Status: SHIPPED | OUTPATIENT
Start: 2023-04-03

## 2023-04-06 ENCOUNTER — TELEPHONE (OUTPATIENT)
Dept: FAMILY MEDICINE CLINIC | Facility: CLINIC | Age: 58
End: 2023-04-06

## 2023-04-15 ENCOUNTER — HOSPITAL ENCOUNTER (OUTPATIENT)
Dept: MAMMOGRAPHY | Age: 58
Discharge: HOME | End: 2023-04-15
Attending: FAMILY MEDICINE
Payer: COMMERCIAL

## 2023-04-15 DIAGNOSIS — Z12.31 SCREENING MAMMOGRAM FOR BREAST CANCER: ICD-10-CM

## 2023-04-15 DIAGNOSIS — Z12.31 ENCOUNTER FOR SCREENING MAMMOGRAM FOR MALIGNANT NEOPLASM OF BREAST: ICD-10-CM

## 2023-04-15 PROCEDURE — 77063 BREAST TOMOSYNTHESIS BI: CPT | Performed by: FAMILY MEDICINE

## 2023-04-15 PROCEDURE — 77067 SCR MAMMO BI INCL CAD: CPT | Performed by: FAMILY MEDICINE

## 2023-04-22 ENCOUNTER — LAB ENCOUNTER (OUTPATIENT)
Dept: LAB | Age: 58
End: 2023-04-22
Attending: INTERNAL MEDICINE
Payer: COMMERCIAL

## 2023-04-22 DIAGNOSIS — K92.1 HEMATOCHEZIA: ICD-10-CM

## 2023-04-22 DIAGNOSIS — R12 CHRONIC HEARTBURN: ICD-10-CM

## 2023-04-22 LAB
ALBUMIN SERPL-MCNC: 3.6 G/DL (ref 3.4–5)
ALBUMIN/GLOB SERPL: 1 {RATIO} (ref 1–2)
ALP LIVER SERPL-CCNC: 78 U/L
ALT SERPL-CCNC: 29 U/L
ANION GAP SERPL CALC-SCNC: 3 MMOL/L (ref 0–18)
AST SERPL-CCNC: 25 U/L (ref 15–37)
BASOPHILS # BLD AUTO: 0.04 X10(3) UL (ref 0–0.2)
BASOPHILS NFR BLD AUTO: 0.6 %
BILIRUB SERPL-MCNC: 0.5 MG/DL (ref 0.1–2)
BUN BLD-MCNC: 15 MG/DL (ref 7–18)
CALCIUM BLD-MCNC: 8.9 MG/DL (ref 8.5–10.1)
CHLORIDE SERPL-SCNC: 108 MMOL/L (ref 98–112)
CO2 SERPL-SCNC: 29 MMOL/L (ref 21–32)
CREAT BLD-MCNC: 0.71 MG/DL
CRP SERPL-MCNC: <0.29 MG/DL (ref ?–0.3)
EOSINOPHIL # BLD AUTO: 0.32 X10(3) UL (ref 0–0.7)
EOSINOPHIL NFR BLD AUTO: 4.8 %
ERYTHROCYTE [DISTWIDTH] IN BLOOD BY AUTOMATED COUNT: 13.2 %
FASTING STATUS PATIENT QL REPORTED: YES
GFR SERPLBLD BASED ON 1.73 SQ M-ARVRAT: 99 ML/MIN/1.73M2 (ref 60–?)
GLOBULIN PLAS-MCNC: 3.5 G/DL (ref 2.8–4.4)
GLUCOSE BLD-MCNC: 93 MG/DL (ref 70–99)
HCT VFR BLD AUTO: 44.7 %
HGB BLD-MCNC: 14.1 G/DL
IMM GRANULOCYTES # BLD AUTO: 0.01 X10(3) UL (ref 0–1)
IMM GRANULOCYTES NFR BLD: 0.1 %
LYMPHOCYTES # BLD AUTO: 2.69 X10(3) UL (ref 1–4)
LYMPHOCYTES NFR BLD AUTO: 40.1 %
MCH RBC QN AUTO: 28.5 PG (ref 26–34)
MCHC RBC AUTO-ENTMCNC: 31.5 G/DL (ref 31–37)
MCV RBC AUTO: 90.5 FL
MONOCYTES # BLD AUTO: 0.55 X10(3) UL (ref 0.1–1)
MONOCYTES NFR BLD AUTO: 8.2 %
NEUTROPHILS # BLD AUTO: 3.09 X10 (3) UL (ref 1.5–7.7)
NEUTROPHILS # BLD AUTO: 3.09 X10(3) UL (ref 1.5–7.7)
NEUTROPHILS NFR BLD AUTO: 46.2 %
OSMOLALITY SERPL CALC.SUM OF ELEC: 291 MOSM/KG (ref 275–295)
PLATELET # BLD AUTO: 502 10(3)UL (ref 150–450)
POTASSIUM SERPL-SCNC: 4.1 MMOL/L (ref 3.5–5.1)
PROT SERPL-MCNC: 7.1 G/DL (ref 6.4–8.2)
RBC # BLD AUTO: 4.94 X10(6)UL
SODIUM SERPL-SCNC: 140 MMOL/L (ref 136–145)
WBC # BLD AUTO: 6.7 X10(3) UL (ref 4–11)

## 2023-04-22 PROCEDURE — 85025 COMPLETE CBC W/AUTO DIFF WBC: CPT

## 2023-04-22 PROCEDURE — 86140 C-REACTIVE PROTEIN: CPT

## 2023-04-22 PROCEDURE — 80053 COMPREHEN METABOLIC PANEL: CPT

## 2023-04-22 PROCEDURE — 36415 COLL VENOUS BLD VENIPUNCTURE: CPT

## 2023-05-11 ENCOUNTER — OFFICE VISIT (OUTPATIENT)
Dept: FAMILY MEDICINE CLINIC | Facility: CLINIC | Age: 58
End: 2023-05-11
Payer: COMMERCIAL

## 2023-05-11 VITALS
WEIGHT: 135.38 LBS | DIASTOLIC BLOOD PRESSURE: 68 MMHG | HEIGHT: 60 IN | RESPIRATION RATE: 16 BRPM | BODY MASS INDEX: 26.58 KG/M2 | SYSTOLIC BLOOD PRESSURE: 102 MMHG | OXYGEN SATURATION: 96 % | HEART RATE: 78 BPM

## 2023-05-11 DIAGNOSIS — H10.33 ACUTE BACTERIAL CONJUNCTIVITIS OF BOTH EYES: ICD-10-CM

## 2023-05-11 DIAGNOSIS — D75.839 THROMBOCYTOSIS: Primary | ICD-10-CM

## 2023-05-11 PROCEDURE — 3078F DIAST BP <80 MM HG: CPT | Performed by: FAMILY MEDICINE

## 2023-05-11 PROCEDURE — 99214 OFFICE O/P EST MOD 30 MIN: CPT | Performed by: FAMILY MEDICINE

## 2023-05-11 PROCEDURE — 3008F BODY MASS INDEX DOCD: CPT | Performed by: FAMILY MEDICINE

## 2023-05-11 PROCEDURE — 3074F SYST BP LT 130 MM HG: CPT | Performed by: FAMILY MEDICINE

## 2023-05-11 RX ORDER — CIPROFLOXACIN HYDROCHLORIDE 3.5 MG/ML
SOLUTION/ DROPS TOPICAL
Qty: 5 ML | Refills: 0 | Status: SHIPPED | OUTPATIENT
Start: 2023-05-11

## 2023-05-30 DIAGNOSIS — R30.0 BURNING WITH URINATION: ICD-10-CM

## 2023-06-01 RX ORDER — NYSTATIN 100000 U/G
CREAM TOPICAL
Qty: 30 G | Refills: 0 | Status: SHIPPED | OUTPATIENT
Start: 2023-06-01

## 2023-06-01 RX ORDER — NYSTATIN 100000 U/G
CREAM TOPICAL
Qty: 30 G | Refills: 0 | OUTPATIENT
Start: 2023-06-01

## 2023-06-01 NOTE — TELEPHONE ENCOUNTER
Pt states she is still having itching and irritation when she urinates and wants to know if she can get more of the nystatin creme>  She saw Sunday Pratt on 04/03/23 for this

## 2023-06-05 ENCOUNTER — TELEPHONE (OUTPATIENT)
Dept: FAMILY MEDICINE CLINIC | Facility: CLINIC | Age: 58
End: 2023-06-05

## 2023-06-05 ENCOUNTER — HOSPITAL ENCOUNTER (OUTPATIENT)
Age: 58
Discharge: HOME | End: 2023-06-05
Payer: COMMERCIAL

## 2023-06-05 VITALS
WEIGHT: 134.5 LBS | SYSTOLIC BLOOD PRESSURE: 133 MMHG | HEART RATE: 68 BPM | TEMPERATURE: 98 F | BODY MASS INDEX: 26 KG/M2 | DIASTOLIC BLOOD PRESSURE: 82 MMHG | OXYGEN SATURATION: 98 % | RESPIRATION RATE: 18 BRPM

## 2023-06-05 DIAGNOSIS — B37.31 CANDIDAL VULVOVAGINITIS: Primary | ICD-10-CM

## 2023-06-05 LAB
POCT BILIRUBIN URINE: NEGATIVE
POCT GLUCOSE URINE: NEGATIVE MG/DL
POCT KETONE URINE: NEGATIVE MG/DL
POCT NITRITE URINE: NEGATIVE
POCT PH URINE: 6.5 (ref 5–8)
POCT PROTEIN URINE: NEGATIVE MG/DL
POCT SPECIFIC GRAVITY URINE: 1.03
POCT URINE CLARITY: CLEAR
POCT URINE COLOR: YELLOW
POCT UROBILINOGEN URINE: 1 MG/DL

## 2023-06-05 PROCEDURE — 81002 URINALYSIS NONAUTO W/O SCOPE: CPT | Performed by: NURSE PRACTITIONER

## 2023-06-05 PROCEDURE — 99214 OFFICE O/P EST MOD 30 MIN: CPT

## 2023-06-05 PROCEDURE — 99213 OFFICE O/P EST LOW 20 MIN: CPT

## 2023-06-05 RX ORDER — ACETAMINOPHEN 500 MG
1000 TABLET ORAL ONCE
Status: COMPLETED | OUTPATIENT
Start: 2023-06-05 | End: 2023-06-05

## 2023-06-05 RX ORDER — HYDROCODONE BITARTRATE AND ACETAMINOPHEN 5; 325 MG/1; MG/1
1 TABLET ORAL EVERY 6 HOURS PRN
Qty: 5 TABLET | Refills: 0 | Status: SHIPPED | OUTPATIENT
Start: 2023-06-05

## 2023-06-05 RX ORDER — FLUCONAZOLE 150 MG/1
150 TABLET ORAL AS DIRECTED
Qty: 2 TABLET | Refills: 0 | Status: SHIPPED | OUTPATIENT
Start: 2023-06-05

## 2023-06-05 NOTE — TELEPHONE ENCOUNTER
Pt said the script for the vaginal cream is not working. She's still having burning and very uncomfortable.   pls advise

## 2023-06-05 NOTE — TELEPHONE ENCOUNTER
She is very uncomfortable. It burns when she urinates and hurts when she wears pants. She is hoping to hear back today.   You can also send her a Digitiliti

## 2023-06-06 NOTE — ED INITIAL ASSESSMENT (HPI)
Pt c/o vaginal itching, irritation and swelling to the area x2 weeks, pt saw md given nystatin cream which hads not been working

## 2023-06-06 NOTE — TELEPHONE ENCOUNTER
Noted.   Eugenio Rayo as fyi. Per PSR pt does not need a call back, just wanted GREG to know, message was not routed to triage initially.

## 2023-06-06 NOTE — DISCHARGE INSTRUCTIONS
Tylenol for pain as needed  Norco for severe pain  Take 1st Diflucan tablet ASAP. Then, take 2nd Diflucan tablet 72-hours later.   Apply Clotrimazole cream nightly for 3-nights  Drink a lot of water  Use moist toilets when toileting  Avoid wearing clothing that rubs against your skin

## 2023-06-06 NOTE — TELEPHONE ENCOUNTER
Patient called first thing this morning to share her disappointment in not receiving any call back yesterday in regards to the pain and symptoms she was having. She had called early in the day and thought she would had received some response as to what to do. She ended up going to UC and wants GREG to review. Patient did have a very bad yeast infection.

## 2023-06-06 NOTE — TELEPHONE ENCOUNTER
Pt asked me for a cream that I initially refused because she had no visit. Im glad she was seen at the .

## 2023-06-06 NOTE — ED NOTES
Patient called stating she needs a note for work so she can work from home and return to the office on 6/12. She also asked if she should be using the clotrimazole cream topically or also inserting into the vagina, as there are disposable inserts with it. Per GONSALO Olea, patient notified she can use the cream both topically and inserted vaginally. Patient also aware to follow-up with her PCP or gynecologist for a recheck. Patient verbalizes understanding, denies any other questions, problems or concerns, and is in agreement with the information provided.

## 2023-06-07 ENCOUNTER — TELEPHONE (OUTPATIENT)
Dept: OBGYN CLINIC | Facility: CLINIC | Age: 58
End: 2023-06-07

## 2023-06-07 ENCOUNTER — TELEPHONE (OUTPATIENT)
Dept: FAMILY MEDICINE CLINIC | Facility: CLINIC | Age: 58
End: 2023-06-07

## 2023-06-07 NOTE — TELEPHONE ENCOUNTER
Pt went to IC - dx yeast infection - prescribed something that is not working. Pt said labia very swollen and intense burning with urination. Can something else be prescribed?

## 2023-06-07 NOTE — TELEPHONE ENCOUNTER
Patient has vaginal burning and is swollen. She was seen in urgent care and was given meds but she does not feel better, she is worse. She called her primary that informed her to call here and see if she can get in sooner. Patient use to see Dr Nakul Renteria. She has appointment her 7/11.   Please call to advise

## 2023-06-07 NOTE — TELEPHONE ENCOUNTER
Per Dr Chapis Claire:  If she is on both topicals and oral Diflucan, I have nothing more to recommend. Pt informed and states she will take 2nd dose of Diflucan tomorrow. Pt will also call gyne to see if she can get appt soon.

## 2023-06-07 NOTE — TELEPHONE ENCOUNTER
62year old patient complaining of vulvar itching and swelling, external burning with urination. She was given cream by PCP and then UC to treat yeast. States she has not had any improvement. Last OV date: 2/16/18 with Dr. Whitney Auguste for gyn problem  Recent Test/Labs: 4/3/23 UTI- treated with augmentin by PCP   Recommendations: appt scheduled for exam. Advised to hold vaginal medication tonight. Can continue with sitz baths and cold compresses. Ok to take second Diflucan as well. Advised to come to office 15 min early as well. Patient states understanding and agrees with plan.

## 2023-06-08 ENCOUNTER — OFFICE VISIT (OUTPATIENT)
Dept: OBGYN CLINIC | Facility: CLINIC | Age: 58
End: 2023-06-08
Payer: COMMERCIAL

## 2023-06-08 VITALS
DIASTOLIC BLOOD PRESSURE: 70 MMHG | HEIGHT: 60 IN | BODY MASS INDEX: 25.91 KG/M2 | WEIGHT: 132 LBS | SYSTOLIC BLOOD PRESSURE: 116 MMHG | HEART RATE: 70 BPM

## 2023-06-08 DIAGNOSIS — R30.0 BURNING WITH URINATION: Primary | ICD-10-CM

## 2023-06-08 DIAGNOSIS — N76.0 VAGINITIS AND VULVOVAGINITIS: ICD-10-CM

## 2023-06-08 LAB
BILIRUBIN: NEGATIVE
GLUCOSE (URINE DIPSTICK): NEGATIVE MG/DL
KETONES (URINE DIPSTICK): NEGATIVE MG/DL
MULTISTIX LOT#: ABNORMAL NUMERIC
NITRITE, URINE: NEGATIVE
PH, URINE: 5.5 (ref 4.5–8)
PROTEIN (URINE DIPSTICK): NEGATIVE MG/DL
SPECIFIC GRAVITY: 1.03 (ref 1–1.03)
UROBILINOGEN,SEMI-QN: 0.2 MG/DL (ref 0–1.9)

## 2023-06-08 PROCEDURE — 81002 URINALYSIS NONAUTO W/O SCOPE: CPT | Performed by: NURSE PRACTITIONER

## 2023-06-08 PROCEDURE — 99213 OFFICE O/P EST LOW 20 MIN: CPT | Performed by: NURSE PRACTITIONER

## 2023-06-08 PROCEDURE — 87480 CANDIDA DNA DIR PROBE: CPT | Performed by: NURSE PRACTITIONER

## 2023-06-08 PROCEDURE — 3008F BODY MASS INDEX DOCD: CPT | Performed by: NURSE PRACTITIONER

## 2023-06-08 PROCEDURE — 87510 GARDNER VAG DNA DIR PROBE: CPT | Performed by: NURSE PRACTITIONER

## 2023-06-08 PROCEDURE — 87660 TRICHOMONAS VAGIN DIR PROBE: CPT | Performed by: NURSE PRACTITIONER

## 2023-06-08 PROCEDURE — 3074F SYST BP LT 130 MM HG: CPT | Performed by: NURSE PRACTITIONER

## 2023-06-08 PROCEDURE — 3078F DIAST BP <80 MM HG: CPT | Performed by: NURSE PRACTITIONER

## 2023-06-08 PROCEDURE — 87529 HSV DNA AMP PROBE: CPT | Performed by: NURSE PRACTITIONER

## 2023-06-08 NOTE — PATIENT INSTRUCTIONS
Take a Claritin in the morning  Take benadryl at bedtime  Combine a pea size of Desitin with prescription ointment and apply sparingly twice a day to affected area 7-10 days

## 2023-06-09 LAB
HSV1 DNA SPEC QL NAA+PROBE: NEGATIVE
HSV2 DNA SPEC QL NAA+PROBE: NEGATIVE

## 2023-09-05 NOTE — TELEPHONE ENCOUNTER
Pt was seen in June for gyn problem and now has itching symptoms again which began 2-3 weeks ago. Pt couldn't take any appts we have available for the next couple days due to work, and would like to see If something can be sent to her pharmacy.  Thank you

## 2023-09-06 NOTE — TELEPHONE ENCOUNTER
62year old patient complaining of vulvar itching. She is using desitin and Kenalog that Fabien Wren recommended but is still having concerns. This is like what she had at [address], but isn't as severe. Denies increased discharge or foul, fishy odor. Denies burning or irritation. Notes slight labia swelling on one side.    Last OV date: 6/8/23 with Fabien Wren for gyn problem  Recent Test/Labs: 6/8/23 HSV negative, vaginitis/vaginosis panel +BV   Recommendations: assisted with scheduling appt for exam.

## 2023-09-06 NOTE — TELEPHONE ENCOUNTER
Call to patient; no answer. Left message to call back.         62year old patient   Last OV date: 6/8/23 with Milady Cagey for gyn problem  Recent Test/Labs: 6/8/23 HSV negative, vaginitis/vaginosis panel +BV

## 2023-09-08 NOTE — PROGRESS NOTES
Subjective:  62year old    Patient presents with:  Vaginal Problem: Vaginal itching     Pt here today with complaints of intermittent vaginal irritation  Pt was previously seen  by VALORIE Marino  Notes that the symptoms have improved significantly but not all the way  States that she is only having irritation every couple days but still using steriod topical cream with Desitin  Irritation occurs only with heat  Denies vaginal discharge and odor    Review of Systems:  Pertinent items are noted in the HPI. Objective:  /68   Pulse 87   Ht 60\"   Wt 134 lb (60.8 kg)     Physical Examination:  General appearance: Well dressed, well nourished in no apparent distress  Neurologic/Psychiatric: Alert and oriented to person, place and time, mood normal, affect appropriate  Abdomen: Soft, non-tender, non-distended, no masses, no hepatosplenomegaly, no hernias, no inguinal lymphadenopathy  Pelvic:    External genitalia- + hypopigmented and thin, Bartholin's, urethra, skeins glands normal       Assessment/Plan:    Diagnoses and all orders for this visit:    Vaginal irritation  - irritation has improved  - recommend that pt discontinue topical steriod and desitin  - can try coconut oil if still irritated  - no soap or other products  - if no improvement in next 2 weeks would consider vuvlar biopsy for further evaluation  - pt to call with new/worsening symptoms or no improvement      Return if symptoms worsen or fail to improve.

## 2023-09-26 NOTE — TELEPHONE ENCOUNTER
Patient is still having vaginal issues. She is now itching terribly. She stats that she did not take her antibiotic correctly so it could be the reason.    Please call to advise

## 2023-09-26 NOTE — TELEPHONE ENCOUNTER
Per office visit note with Nolberto Prader, NP-C dated 9/8/2023:  Vaginal irritation  - irritation has improved  - recommend that pt discontinue topical steriod and desitin  - can try coconut oil if still irritated  - no soap or other products  - if no improvement in next 2 weeks would consider vuvlar biopsy for further evaluation  - pt to call with new/worsening symptoms or no improvement    Patient states that her external itching has continued. Denies discharge or odor. Confirms that she discontinued topical steroid, desitin and soaps. Patient states she has not tried the coconut oil. Advised patient to apply coconut oil as directed to the itchy/irritated area. Patient verbalized understanding. Patient to call back if symptoms persist or worsen after trying coconut oil. Future appointment already pending for 10/12/23.

## 2023-10-10 NOTE — PATIENT INSTRUCTIONS
Lifetime risk of Shingles (must have had chicken pox or have been exposed to it) is 30%. For -Americans this risk is 15%. Shingrix lowers the risk to 1.5 to 3%. The previously used Zostavax lowers the risk to 6% of ever getting shingles in your lifetime. Shingrix: vaccine released in 2018. Prevents shingles 90-95% of the time. Two doses are given between 2 and 6 months apart. Side effects: Pain at injection site 70-90% of cases. Redness in almost 40%. Swelling in almost 30%. Also risk of muscle aches over 50%, fatigue over 50%, headache 50%, and fever or chills and approximately 25%. The side effects resolve in 2-3 days. Cost approximately $190 per dose at [facility]. Covered by Medicare as of January 1, 2023; part D through pharmacy only, not through the office. If you have other insurance,you may want to check with your insurance company for coverage. Typically covered by HMO plans; PPO plans vary by the contract. You can get shingles more than once and thus should be vaccinated even if you have had them before. If you elect to do this in the future, you may schedule a nurse visit if within the next 365 days. If the first dose is done today, the second dose should be scheduled as a nurse visit in 2 to 6 months. Patient information for tdap vaccine:  Tetanus bacteria causes lockjaw. You may be exposed by something gabby entering the skin or soil entering the skin. Rare in the [place], 10-20 cases per year. Vaccine may still be effective up to 72 hours after an exposure. Diphtheria causes a sore throat with white spots similar to strep. Extremely rare in the [place] but you may be exposed if you travel over the borders. Pertussis or whooping cough is most dangerous to infants and toddlers. Still see 15,000 to 20,000 cases in the [place] annually. In adults this may cause a cough lingering up to 100 days, contagious the first few weeks.   Antibiotics useful in children but not effective in adults. One vaccine covers these 3 illnesses and is good for 10 years.

## 2023-10-23 NOTE — TELEPHONE ENCOUNTER
Pt states she was given a medication 10/12 and she is still experiencing burning and would like something else.  Thank you

## 2023-10-24 NOTE — TELEPHONE ENCOUNTER
Patient calling with c/o vaginal dryness and pain. Was seen recently and has been using the Clobetasol but states it's not working. C/o pain while urinating because her skin is raw and the urine irritates it more. Advised to use coconut oil or Desitin to help with the healing process during the day. Advised to use the Clobetasol at night time and if she can to no wear underwear/pants at night time. All questions answered.

## 2024-03-12 NOTE — PROGRESS NOTES
Subjective:  58 year old    Chief Complaint   Patient presents with    Vaginal Problem     Cream is helping, was swollen, little odor, little bleeding outside labia, no burning sensations right now,      Follow up to lichen simplex.  Patient complains of persistent itching and swelling, possible slight odor.  Diagnosed with BV last .  Saw Dr. Patiño in October and diagnosed with possible lichen simplex.  Rx for Clobetasol, which has been helping.  Went off the medication for a few weeks and symptoms returned.      Review of Systems:  Pertinent items are noted in the HPI.    Objective:  /64   Pulse 71   Ht 60\"   Wt 130 lb (59 kg)     Physical Examination:  General appearance: Well dressed, well nourished in no apparent distress  Neurologic/Psychiatric: Alert and oriented to person, place and time, mood normal, affect appropriate  Abdomen: Soft, non-tender, non-distended, no masses, no hepatosplenomegaly, no hernias, no inguinal lymphadenopathy  Pelvic:    External genitalia- whitening and thinning of tissues of vulva and absence of labius minus consistent with lichen simplex.  Discoloration and ecchymosis, especially 8 O'clock just lateral to labium minus on right side   Vagina- No vaginal lesions, no discharge   Cervix- 1x2 cm flat oval polyp protruding from external cervical os, long/closed, no cervical motion tenderness      Assessment/Plan:  Lichen simplex- continue clobetasol every other day for one month, then switch to betamethasone twice daily for one month.  Recommend vulvar biopsy from right side.  Endocervical polyp- recommend LEEP polypectomy at same visit with vulvar biopsy    Diagnoses and all orders for this visit:    Lichen simplex chronicus  -     betamethasone 0.1 % External Cream; Apply 1 Application topically in the morning and 1 Application before bedtime. For four weeks.    Endocervical polyp      Return for LEEP cervical polypectomy and vulvar biopsy same day.

## 2024-03-15 NOTE — TELEPHONE ENCOUNTER
Pt states she had an office visit earlier this week and said she gave a urine sample but wasn't told what it was for or what was happening to it. She is upset and would like to know the reason and she wants to find out today. Notes on appointment say possible UTI/infection but she says that's not what she was there for just what was written down. Please advise, thank you

## 2024-03-15 NOTE — TELEPHONE ENCOUNTER
Pt. called back and very upset.  Pt. says she does not understand why she had to give a urine sample and what it was for, I said that on her appointment information it said possible UTI infection  so that was probably why they asked for sample...pt. says that was never explained to ..She was  just given a cup and then nothing was ever mentioned about it ever again during the appointment...now wants her urine tested again, asked what happened to the urine sample she gave., I told her it was probably disposed of...Since when you look at her ov note UTI infection or urine collection is never mentioned, could be  Dr. Ramires said to just cancel UA ????And unfortunately MA (that roomed pt.) off today and could not ask her for specifics ..She wants answers and talk with manager about this situation..    Will notify Ora about this Monday..

## 2024-03-18 NOTE — TELEPHONE ENCOUNTER
Pt. did call back and said she is still having issues and symptoms of dysuria..painful urination urination, burning sensation at times, foul odor ..> Encouraged her to drink fluids especially water.... at  least 64 oz daily along with cranberry juice..She agrees to get U/A.C/S at lab..  Spoke with Mily and Ora...Mily will put order thru for pt...since Mihaela Koenig is off next couple of days.    Pt. verbalizes understanding ..Will get U/A done ..

## 2024-03-18 NOTE — TELEPHONE ENCOUNTER
Called and left a vm for pt. to call back and let her know that we can send in an order for an UA/ for her ,to get done at her convenience  at  her lab  ([facility]) if she is still having symptoms of dysuresia ..

## 2024-03-26 NOTE — PROGRESS NOTES
Hermelinda Chen is a 58 year old female.  HPI:   Here for follow up of blood in her urine.  Had a urine sample done at [facility] and microscopic blood was detected.  Pt was referred to Urology.  Pt is going on vacation in a few days and would like to make sure things are ok enough for her to take her trip- going to her place in [place].  She has an appt on April 15 with Urology.  Denies flank pain, colicky or traveling pain.    Colonoscopy scheduled for July.   Current Outpatient Medications   Medication Sig Dispense Refill    betamethasone 0.1 % External Cream Apply 1 Application topically in the morning and 1 Application before bedtime. For four weeks. 45 g 0    famotidine 20 MG Oral Tab Take 1 tablet (20 mg total) by mouth nightly as needed for Heartburn. 45 tablet 3    mesalamine (CANASA) 1000 MG Rectal Suppos Place 1 suppository (1,000 mg total) rectally nightly. 90 suppository 3    clobetasol 0.05 % External Ointment Use a pea-sized amount on the outside of the vagina nightly for four weeks, then every other night for four weeks, then twice a week. 60 g 0    atorvastatin 20 MG Oral Tab Take 1 tablet (20 mg total) by mouth nightly. 90 tablet 1      Past Medical History:   Diagnosis Date    Abdominal pain     Anxiety state, unspecified     not active    Atypical mole     Bad breath     Bleeding nose     Blood in the stool     Chronic cough     Decorative tattoo     Diarrhea, unspecified     Diverticulitis of colon (without mention of hemorrhage)(562.11) 06/29/2012    Dizziness     Fatigue     Flatulence/gas pain/belching     Headache disorder     Heartburn     High cholesterol     Hyperlipidemia     Indigestion     Leg swelling     Lump or mass in breast 06/29/2012    Migraine, unspecified, without mention of intractable migraine without mention of status migrainosus 06/29/2012    Night sweats     Painful swallowing     Problems with swallowing     Stress     Ulcerative colitis, chronic (HCC)     Unspecified  hemorrhoids without mention of complication     Weight gain     Wheezing       Social History:  Social History     Socioeconomic History    Marital status:    Tobacco Use    Smoking status: Never    Smokeless tobacco: Never   Vaping Use    Vaping Use: Never used   Substance and Sexual Activity    Alcohol use: No    Drug use: No    Sexual activity: Yes     Partners: Male   Other Topics Concern    Caffeine Concern Yes    Exercise No    Seat Belt Yes        REVIEW OF SYSTEMS:   GENERAL HEALTH: feels well otherwise; denies fever or chills  SKIN: denies any unusual skin lesions or rashes  RESPIRATORY: denies shortness of breath or cough  CARDIOVASCULAR: denies chest pain or pressure  GI: denies N/V/D    : denies dysuria; no incontinence; feels that she can always urinate but this is not new for her  NEURO: denies headaches, denies dizziness; denies numbness or tingling    EXAM:   /80   Pulse 78   Resp 20   Ht 5' (1.524 m)   Wt 120 lb (54.4 kg)   SpO2 98%   BMI 23.44 kg/m²   GENERAL: well developed, well nourished,in no apparent distress  SKIN: warm & dry  HEENT: atraumatic, normocephalic  NECK: supple,no adenopathy   LUNGS: CTA, easy breathing  CV: normal S1S2, RRR without murmur  GI: large abdomen, soft; good BS's,no masses, HSM or tenderness  EXT: no cyanosis, clubbing or edema    ASSESSMENT AND PLAN:   1. Benign essential microscopic hematuria  - keep Urology appt  - CBC With Differential With Platelet; Future  - Comp Metabolic Panel (14); Future      The patient indicates understanding of these issues and agrees to the plan.  Follow up after Urology if needed.

## 2024-04-12 NOTE — TELEPHONE ENCOUNTER
LMVM advising pt that PA will not be in the office at the time of her appt, so her appt will need to be rescheduled. Asked pt to call back to reschedule.     PT aware that appt has been cancelled

## 2024-04-14 NOTE — ED PROVIDER NOTES
Patient Seen in: [facility] [place]      History     Chief Complaint   Patient presents with    Constipation     Stated Complaint: Constipation - diarrhea yesterday - co abdominal pain    Subjective:   HPI    Patient is a previous history ulcerative colitis who presents to the emergency department for evaluation of left lower quadrant abdominal pain which feels like a pressure and reminds her of encouraged to move her bowels.  Patient had recently been having hematuria with no pain and was referred to see urology.  That evaluation has not happened yet however.  Current discomfort and sensation of needing to move her bowels began a few days ago.  Patient had eaten some prunes 2 days ago to try to address it and had some loose stools after that.  Yesterday she also had a small bowel movement but symptoms continue to fluctuate with episodes of severe pressure leading her to spend a lot of time straining trying to go the bathroom.  She has also noted some urinary frequency and urgency.  No fever, no vomiting, no cough, no other associated symptoms.  Pain is not influenced by movement or palpation.    Objective:   Past Medical History:    Abdominal pain    Anxiety state, unspecified    not active    Atypical mole    Bad breath    Bleeding nose    Blood in the stool    Chronic cough    Decorative tattoo    Diarrhea, unspecified    Diverticulitis of colon (without mention of hemorrhage)(562.11)    Dizziness    Fatigue    Flatulence/gas pain/belching    Headache disorder    Heartburn    High cholesterol    Hyperlipidemia    Indigestion    Leg swelling    Lump or mass in breast    Migraine, unspecified, without mention of intractable migraine without mention of status migrainosus    Night sweats    Painful swallowing    Problems with swallowing    Stress    Ulcerative colitis, chronic (HCC)    Unspecified hemorrhoids without mention of complication    Weight gain    Wheezing              Past  Surgical History:   Procedure Laterality Date    Colonoscopy      Cyst aspiration left Left 2012    benign                Social History     Socioeconomic History    Marital status:    Tobacco Use    Smoking status: Never     Passive exposure: Never    Smokeless tobacco: Never   Vaping Use    Vaping status: Never Used   Substance and Sexual Activity    Alcohol use: No    Drug use: No    Sexual activity: Yes     Partners: Male   Other Topics Concern    Caffeine Concern Yes    Exercise No    Seat Belt Yes              Review of Systems    Positive for stated complaint: Constipation - diarrhea yesterday - co abdominal pain  Other systems are as noted in HPI.  Constitutional and vital signs reviewed.      All other systems reviewed and negative except as noted above.    Physical Exam     ED Triage Vitals [04/14/24 0533]   /69   Pulse 93   Resp 16   Temp 98.6 °F (37 °C)   Temp src Oral   SpO2 97 %   O2 Device None (Room air)       Current:/71   Pulse 65   Temp 97.6 °F (36.4 °C) (Temporal)   Resp 18   Wt 59 kg   SpO2 98%   BMI 25.39 kg/m²         Physical Exam    Constitutional: No apparent distress  Eyes: No scleral icterus  Heart: regular rate rhythm, no murmurs  Lungs: Clear to auscultation bilaterally  Abdomen: Soft, nontender, normal active bowel sounds.  Skin: No rash  Neuro: Alert and oriented ×3          ED Course/ My interpretations:     Labs Reviewed   URINALYSIS, ROUTINE - Abnormal; Notable for the following components:       Result Value    Clarity Urine Hazy (*)     Blood Urine Large (*)     Protein Urine Trace (*)     RBC Urine >10 (*)     Bacteria Urine Rare (*)     Squamous Epi. Cells Moderate (*)     Ca Oxalate Crystals Moderate (*)     All other components within normal limits   BASIC METABOLIC PANEL (8) - Abnormal; Notable for the following components:    Glucose 120 (*)     All other components within normal limits   UA MICROSCOPIC ONLY, URINE - Abnormal; Notable for the  following components:    RBC Urine >10 (*)     Bacteria Urine Rare (*)     Squamous Epi. Cells Moderate (*)     Ca Oxalate Crystals Moderate (*)     All other components within normal limits   CBC WITH DIFFERENTIAL WITH PLATELET    Narrative:     The following orders were created for panel order CBC With Differential With Platelet.  Procedure                               Abnormality         Status                     ---------                               -----------         ------                     CBC W/ DIFFERENTIAL[[id number]]                              Final result                 Please view results for these tests on the individual orders.   CBC W/ DIFFERENTIAL         Medications given:   UA Microscopic only, urine    ketorolac (Toradol) 15 MG/ML injection 15 mg    sodium chloride 0.9 % IV bolus 1,000 mL    iopamidol 76% (ISOVUE-370) injection for power injector            MDM      Extensive differential diagnosis was considered for the patient including appendicitis, gastroenteritis, cholecystitis, pancreatitis, diverticulitis, urinary tract infection, perforated viscus, mesenteric ischemia, peptic ulcer disease, pyelonephritis, nephrolithiasis, and other GI, urologic, gynecologic, infectious, vascular and other pathology.    Given the patient's clinical picture CT of the abdomen pelvis was deemed clinically necessary.  Results reviewed, given patient's clinical picture, persistently nontender abdomen on exam I suspect symptoms are related to the small renal calculi.  Patient will be treated with Flomax, pain medication and antiemetics as needed.    Given the mild colitis noted on CT Case discussed with Dr. Rivera, GI.  She felt this is consistent with patient's ulcerative colitis given her recent colonoscopy results and felt there was no indication to suspect an acute exacerbation of her colitis at this time.  Patient is to follow-up with them if pain were to persist or if she were to develop any  blood in stool.    Patient is improved.      Based on the patient's history, physical exam and emergency department workup I do not suspect underlying surgical pathology.  Patient can be safely discharged home with outpatient follow-up.  They will follow-up as directed.    Patient demonstrated understanding, they are comfortable with the plan and will follow-up as directed.          Disposition and Plan     Clinical Impression:  1. Abdominal pain, left lower quadrant    2. Hematuria, unspecified type    3. Ureterolithiasis         Disposition:  Discharge  4/14/2024  8:13 am    Follow-up:  Abbey Isaac DO  [address] [address]  [phone]    Schedule an appointment as soon as possible for a visit  For recheck    Franki Raymundo MD  [address]  [place] [address]  [phone]    Schedule an appointment as soon as possible for a visit  For recheck          Medications Prescribed:  Discharge Medication List as of 4/14/2024  8:25 AM        START taking these medications    Details   tamsulosin (FLOMAX) 0.4 MG Oral Cap Take 1 capsule (0.4 mg total) by mouth daily for 7 days., Normal, Disp-7 capsule, R-0      HYDROcodone-acetaminophen 5-325 MG Oral Tab Take 1-2 tablets by mouth every 6 (six) hours as needed for Pain., Normal, Disp-20 tablet, R-0      ondansetron 4 MG Oral Tablet Dispersible Take 1 tablet (4 mg total) by mouth every 4 (four) hours as needed for Nausea., Normal, Disp-10 tablet, R-0                                    Documentation created with the aid of Dragon voice recognition software.  Although efforts were made to ensure the accuracy of the note, some inaccuracies may persist.

## 2024-04-22 NOTE — PATIENT INSTRUCTIONS
Schedule CT scan #[phone].   Pending results will consider ureteroscopy vs cystoscopy in the office (information below)    Cystoscopy    Cystoscopy is a test that allows your doctor to look at the inside of the bladder and the urethra using a thin, lighted instrument called a cystoscope.  The cystoscope is inserted into your urethra and slowly advanced into the bladder. Cystoscopy allows your doctor to look at areas of your bladder and urethra that usually do not show up well on X-rays. Tiny surgical instruments can be inserted through the cystoscope that allow your doctor to remove samples of tissue (biopsy) or samples of urine.  Small bladder stones and some small growths can be removed during cystoscopy. This may eliminate the need for more extensive surgery.     Why It Is Done  Cystoscopy may be done to:  Find the cause of symptoms such as blood in the urine (hematuria), painful urination (dysuria), urinary incontinence, urinary frequency or hesitancy, an inability to pass urine (retention), or a sudden and overwhelming need to urinate (urgency).   Find the cause of problems of the urinary tract, such as frequent, repeated urinary tract infections or urinary tract infections that do not respond to treatment.   Look for problems in the urinary tract, such as blockage in the urethra caused by an enlarged prostate, kidney stones, or tumors.   Evaluate problems that cannot be seen on X-ray or to further investigate problems detected by ultrasound or during intravenous pyelography, such as kidney stones or tumors.   Remove tissue samples for biopsy.   Remove foreign objects.   Treat urinary tract problems. For example, cystoscopy can be done to remove urinary tract stones or growths, treat bleeding in the bladder, relieve blockages in the urethra, or treat or remove tumors.   How To Prepare  You may eat and drink normally before the procedure. You may be asked to give a urine sample at the time of your  procedure. Please do not urinate before.    How It Is Done  Cystoscopy is performed by a urologist, with one or more assistants. The test is done in a special testing room in the doctor's office.  You will need undress from the waste down, and you will be given a cloth or paper covering to use during the test. You will lie on your back on a special table. Females will have their knees bent and feet placed in stirrups. Males will lay flat on the table, legs straight. Your genital area is cleaned with an antiseptic solution, and your abdomen and thighs are covered with sterile cloths. A local anesthetic jelly will be inserted into the urethra. No needles are used.   After the anesthetic takes effect, a well-lubricated cystoscope is inserted into your urethra and slowly advanced into your bladder. If your urethra has a spot that is too narrow to allow the scope to pass, other smaller instruments are inserted first to gradually enlarge the opening.  After the cystoscope is inside your bladder, either sterile water or saline is injected through the scope to help expand your bladder and to create a clear view. Tiny instruments may be inserted through the scope to collect tissue samples for biopsy if necessary; the tissue samples then are sent to the laboratory for analysis.  The cystoscope is usually in your bladder for only 2 to 10 minutes. But the entire test may take up to 30 minutes or longer.  You will be able to get up immediately following the procedure and proceed with normal daily activities.     How It Feels  Most people report that this test is not nearly as uncomfortable as they had expected.   When local anesthetic is used, you may feel a burning sensation or an urge to urinate when the instrument is inserted and removed. Also, when your bladder is irrigated with sterile water or saline, you may feel a cool sensation, an uncomfortable fullness, and an urgent need to urinate. Try to relax during the test by  taking slow, deep breaths. Also, if the test is lengthy, lying on the table can become tiring and uncomfortable.     After the test  After the test, you may need to urinate frequently, with some burning during and after urination for a day or two. Drink lots of fluids to help minimize the burning and to prevent a urinary tract infection. You may also see a tinge of blood in the urine for 2-3 days.    You will be given an instruction sheet following your cystoscopy with post procedure instructions.     Results  Cystoscopy is a test that allows the doctor to look at the inside of the bladder and the urethra. Your doctor may be able to talk to you about some of the results right after the cystoscopy. If a biopsy is preformed, the results usually take several days to become available. You will be called promptly with results.   Thank you for the pleasure of allowing us to be involved in your care.      General Recommendations to Avoid Future Kidney Stones    1. Drink enough water to produce 2 liters of clear urine daily. You may need to use a container at first to measure how much you are actually producing and increase your intake accordingly. Try to start the day off with a large glass of water as we all wake up dehydrated in the morning.    2. Add lemon or lime juice to your water. These juices contain citrate which naturally inhibits stone formation. An easy way to do this is using sugar free lemonade mix (ie Crystal Light or True Lemon (if you prefer to avoid aspartame))    3. Avoid salty foods such as prepackaged and fast foods, and do not add salt to foods. Try to limit sodium intake to 2500mg maximum.     4. Decrease phosphorus (soda) and caffeine.     5. Limit intake of the following group of foods to one serving daily: spinach, nuts (especially almonds), tea (especially black tea), chocolate, and potatoes.    6. Limit intake of Vitamin C supplements to less than 1000 mg daily.    7. Limit intake of animal  protein (beef, chicken, turkey, fish, pork) to one serving daily. A good rule for portion size is no more meat than will fit in the palm of your hand.    8. Limit roasted nuts to 2-3 servings per week.    9.  Maintain 1-2 servings of dairy products daily (1 serving = 1 glass of milk, 2 slices of cheese, 1 scoop of ice cream, or 1 cup of yogurt). Try to decrease cheese intake as it may increase kidney stone risk compared to other dairy products. Do not eliminate calcium from your diet as it is necessary for bone health.   -Females should try not to exceed 500mg per day  -Males should try not to exceed 750mg per day    10. Take a daily B complex vitamin or 100mg vitamin B6 daily. This may help to decrease oxalate in the urine.     11. Take a daily magnesium supplement of 300mg daily. Magnesium binds to oxalate in the same way that calcium does but is more soluble.  Magnesium will take the place of calcium when binding with oxalate and be less prone to form stones.    12. Eat a low fat diet and exercise at least 30 minutes 3 times per week to try and maintain your ideal body weight. Being overweight is a risk factor for kidney stones too!

## 2024-04-22 NOTE — PROGRESS NOTES
[facility], [facility]    Urology Consult Note    History of Present Illness:   Patient is a 58 year old female with hx of anxiety, ulcerative colitis, HL, migraines, who presents today for consultation from Dr. Rahman's office for nephrolithiasis.    Patient presented to the ED for constipation and left lower abdominal pain. CT scan obtained 24 that showed possible 1-2 mm stone in the distal left ureteral stone with mild dilation of left renal collecting system. Microscopic urinalysis >10 RBC/hpf, rare bacteria, moderate calcium oxalate crystals. Urine culture negative. Scr 0.80.     Abdominal pain has resolved, but ongoing urinary urgency or frequency. Remains on tamsulosin, but has 2 doses left - but has been causing diarrhea.  No flank pain.  No prior stones.     UA today small blood, trace leuks.     No gross hematuria  No hx of tobacco use or chemical exposures.  No FH of  cancers.    HISTORY:  Past Medical History:    Abdominal pain    Anxiety state, unspecified    not active    Atypical mole    Bad breath    Bleeding nose    Blood in the stool    Chronic cough    Decorative tattoo    Diarrhea, unspecified    Diverticulitis of colon (without mention of hemorrhage)(562.11)    Dizziness    Fatigue    Flatulence/gas pain/belching    Headache disorder    Heartburn    High cholesterol    Hyperlipidemia    Indigestion    Leg swelling    Lump or mass in breast    Migraine, unspecified, without mention of intractable migraine without mention of status migrainosus    Night sweats    Painful swallowing    Problems with swallowing    Stress    Ulcerative colitis, chronic (HCC)    Unspecified hemorrhoids without mention of complication    Weight gain    Wheezing      Past Surgical History:   Procedure Laterality Date    Colonoscopy      Cyst aspiration left Left     benign      Family History   Problem Relation Age of Onset    Cancer Mother         breast cancer -      Breast Cancer Mother 67    Diabetes Father     Heart Disorder Father     Hypertension Father     Lipids Father     Stroke Father     Diabetes Sister     Colon Cancer Sister       Social History:   Social History     Socioeconomic History    Marital status:    Tobacco Use    Smoking status: Never     Passive exposure: Never    Smokeless tobacco: Never   Vaping Use    Vaping status: Never Used   Substance and Sexual Activity    Alcohol use: No    Drug use: No    Sexual activity: Yes     Partners: Male   Other Topics Concern    Caffeine Concern Yes    Exercise No    Seat Belt Yes        Allergies  No Known Allergies    Review of Systems:   A 10-point review of systems was completed and is negative other than as noted above.    Physical Exam:   There were no vitals taken for this visit.    GENERAL APPEARANCE: well developed, well nourished, in no acute distress  NEUROLOGIC: no localizing neurologic signs, alert and oriented x 3, converses appropriately  HEAD: atraumatic, normocephalic  EYES: sclera non-icteric  ORAL CAVITY: mucosa moist  NECK/THYROID: no obvious masses or goiter  LUNGS: non-labored breathing  EXTREMITIES: warm, well-perfused. No clubbing, cyanosis or edema.  SKIN: no obvious rashes    Results:     Laboratory Data:  Lab Results   Component Value Date    WBC 7.8 04/14/2024    HGB 14.3 04/14/2024    .0 04/14/2024     Lab Results   Component Value Date     04/14/2024    K 3.7 04/14/2024     04/14/2024    CO2 26.0 04/14/2024    BUN 12 04/14/2024     (H) 04/14/2024    GFRAA 103 03/16/2022    AST 20 03/27/2024    ALT 21 03/27/2024    TP 7.4 03/27/2024    ALB 3.7 03/27/2024    CA 8.8 04/14/2024    MG 2.3 12/30/2017       Urinalysis Results (last three years):  Recent Labs     03/16/22  1244 04/03/23  1614 06/05/23  1949 06/08/23  0947 03/23/24  0909 04/14/24  0544 04/22/24  1602   COLORUR Straw  --   --   --  Yellow Yellow  --    CLARITY Clear  --   --   --  Clear Hazy*  --     SPECGRAVITY 1.010 >=1.030 1.030 1.030 1.026 >=1.030 1.025   PHURINE 7.0 5.5  --  5.5 6.5 5.0 6.0   PROUR Negative  --   --   --  20* Trace*  --    GLUUR Negative  --  Negative  --  Normal Negative  --    KETUR Negative  --   --   --  Negative Negative  --    BILUR Negative  --   --   --  Negative Negative  --    BLOODURINE Small*  --   --   --  2+* Large*  --    NITRITE Negative Negative  --  Negative Negative Negative Negative   UROBILINOGEN <2.0  --   --   --  2* 0.2  --    LEUUR Negative  --   --   --  75* Negative  --    WBCUR None Seen  --   --   --  11-20* 1-5  1-5  --    RBCUR 0-2  --   --   --  >10* >10*  >10*  --    BACUR None Seen  --   --   --  None Seen Rare*  Rare*  --        Urine Culture Results (last three years):  Lab Results   Component Value Date    URINECUL No Growth at 18-24 hrs. 03/23/2024    URINECUL (A) 04/03/2023     10,000 - 50,000 CFU/ML Beta hemolytic Streptococcus, NOT Group A    URINECUL >100,000 CFU/ML Escherichia coli (A) 09/13/2016    URINECUL <10,000 CFU/ML Mixed Vania 12/31/2015       Imaging  [facility] YANETH 2D+3D SCREENING BILAT (CPT=77067/89158)    Result Date: 4/22/2024  PROCEDURE:  [facility] YANETH 2D+3D SCREENING BILAT (CPT=77067/99789)  COMPARISON:  MG JASPER [facility] YANETH 2D+3D SCRN [facility] W/CAD BILAT (CPT=/81719/20055), 10/29/2016, 7:17 AM.  CHANELLE & MG STUART [facility] YANETH 2D+3D SCREENING BILAT (CPT=77067/44290), 12/30/2017, 8:59 AM.  External Exams, MG TITO YANETH 2D+3D SCREENING BILAT (CPT=77067/33525), 9/04/2020, 12:32 PM.  CHANELLE & STUART, , [facility] YANETH 2D+3D SCREENING BILAT (CPT=77067/32083), 3/26/2022, 8:05 AM.  [address], MG, [facility] YANETH 2D+3D SCREENING BILAT (CPT=77067/95860), 4/15/2023, 7:28 AM.  INDICATIONS:  Z12.31 Encounter for screening mammogram for malignant neoplasm of breast  VIEWS OBTAINED:  Routine views of both breasts were obtained.  Standard 2D and additional multiplane thin sections of the breasts were obtained for the purpose of Tomosynthesis evaluation.  The images were  reconstructed and reviewed on the dedicated Tomosynthesis workstation.  BREAST COMPOSITION:  Heterogeneously dense, which may obscure small masses.  FINDINGS: Stable parenchymal pattern both breasts. No suspicious calcifications, spiculated masses or areas of architectural distortion in either breast.               CONCLUSION:  Stable mammogram dating back to 10/29/2016.   BI-RADS CATEGORY:  DIAGNOSTIC CATEGORY 1--NEGATIVE ASSESSMENT.   RECOMMENDATIONS:  ROUTINE MAMMOGRAM AND CLINICAL EVALUATION IN 12 MONTHS.   Because of breast density, this patient may benefit from supplemental screening with breast MRI, Molecular Breast Imaging (MBI) or bilateral whole breast ultrasound for increased sensitivity for detection of cancer which can be obscured mammographically.   Please contact your ordering provider to discuss supplemental screening options.    A letter explaining the results in lay terms has been sent to the patient.  This exam was evaluated with a computer-aided device.  This patient's information has been entered into a reminder system with a target due date for the next mammogram.   [place]   Dictated by (CST): Nicole Major MD on 4/22/2024 at 7:52 AM     Finalized by (CST): Nicole Major MD on 4/22/2024 at 7:54 AM       CT ABDOMEN+PELVIS(CONTRAST ONLY)(CPT=74177)    Result Date: 4/14/2024  PROCEDURE:  CT ABDOMEN+PELVIS (CONTRAST ONLY) (CPT=74177)  COMPARISON:  [facility] , CT, CT ABD   PEL W/CONT, 6/04/2012, 4:41 PM.  INDICATIONS:  Constipation - diarrhea yesterday - co abdominal pain  TECHNIQUE:  CT scanning was performed from the dome of the diaphragm to the pubic symphysis with non-ionic intravenous contrast material. Post contrast coronal MPR imaging was performed.  Dose reduction techniques were used. Dose information is transmitted to the ACR (American College of Radiology) NRDR (National Radiology Data Registry) which includes the Dose Index Registry.  PATIENT STATED HISTORY:(As transcribed by  Technologist)  Pt complains of constipation. States she had diarrhea yesterday and now hwo the urge to go and can't.   CONTRAST USED:  80cc of Isovue 370  FINDINGS:  LIVER:  BILIARY:  No visible dilatation or calcification.  PANCREAS:  No lesion, fluid collection, ductal dilatation, or atrophy.  SPLEEN:  No enlargement or focal lesion.  KIDNEYS:  Minimal high density in the distal left ureter at the ureterovesicular junction is noted.  This may represent several tiny 1-2 mm calculi.  There is mild dilatation of the left renal collecting system. ADRENALS:  No mass or enlargement.  AORTA/VASCULAR:  No aneurysm or dissection.  RETROPERITONEUM:  No mass or adenopathy.  BOWEL/MESENTERY:  There is mild thickening in the descending and sigmoid colon.  The appendix is unremarkable. ABDOMINAL WALL:  No mass or hernia.  URINARY BLADDER:  No visible focal wall thickening, lesion, or calculus.  PELVIC NODES:  No adenopathy.  PELVIC ORGANS:  No visible mass.  Pelvic organs appropriate for patient age.  BONES:  No bony lesion or fracture.  LUNG BASES:  No visible pulmonary or pleural disease.  OTHER:  Negative.             CONCLUSION:   1. Mild thickening in the descending sigmoid colon may be due to infectious or inflammatory etiology.  Ischemia is considered unlikely.  2. There is minimal high density in the distal left ureter at the ureterovesicular junction.  This is difficult to fully delineate.  This may be due to multiple tiny 1-2 mm calculi.  This is causing mild fullness/dilatation of the left renal collecting system.    LOCATION:  [facility]   Dictated by (CST): Mani Santizo MD on 4/14/2024 at 7:17 AM     Finalized by (CST): Mani Santizo MD on 4/14/2024 at 7:22 AM           Impression:     Patient is a 58 year old female with hx of anxiety, ulcerative colitis, HL, migraines, who presents today for consultation from Dr. Rahman's office for nephrolithiasis.    Reviewed CT scan images with patient, possible tiny  left distal ureteral stone. There appears to be some thickening proximal to this density. Given ongoing symptoms and microscopic hematuria would recommend she proceed with CT Urogram for further evaluation. Pending results would consider need for ureteroscopy with possible biopsy/stone removal/stent placement vs in office cystoscopy.    Information regarding stone prevention provided to patient.    Recommendations:  CT Urogram. Urine to be sent for cytology today.  Strain all urine. Complete tamsulosin, not necessary to refill.   Pending results will give further recommendations.     Thank you very much for this consult. Please call if there are any questions or concerns.     Emily Knox PA-C  Urology  [facility]    Date: 4/22/2024

## 2024-04-23 NOTE — PROGRESS NOTES
Patient notified.  Normal mammogram.  Repeat one year.  Recommend yearly whole breast screening ultrasound as well due to increased breast density.

## 2024-04-26 NOTE — ED PROVIDER NOTES
Patient Seen in: [facility] [place]      History     Chief Complaint   Patient presents with    Abdomen/Flank Pain     Stated Complaint: kidney stones    Subjective:   HPI    This is a 58-year-old woman, history of ulcerative colitis, kidney stones, here for evaluation of left lower quadrant pain.  Seen in the ER 12 days ago for similar symptoms, diagnosed with likely ureteral stone, has followed up with urology since then, has outpatient CT urogram pending.  States she was doing well however the pain returned quite severely this morning.  Denies any change in bowel habits, no bloody bowel movements.  Denies any fevers any vomiting, any other complaints.      Objective:   Past Medical History:    Abdominal pain    Anxiety state, unspecified    not active    Atypical mole    Bad breath    Bleeding nose    Blood in the stool    Chronic cough    Decorative tattoo    Diarrhea, unspecified    Diverticulitis of colon (without mention of hemorrhage)(562.11)    Dizziness    Fatigue    Flatulence/gas pain/belching    Headache disorder    Heartburn    High cholesterol    Hyperlipidemia    Indigestion    Leg swelling    Lump or mass in breast    Migraine, unspecified, without mention of intractable migraine without mention of status migrainosus    Night sweats    Painful swallowing    Problems with swallowing    Stress    Ulcerative colitis, chronic (HCC)    Unspecified hemorrhoids without mention of complication    Weight gain    Wheezing              Past Surgical History:   Procedure Laterality Date    Colonoscopy      Cyst aspiration left Left 2012    benign                Social History     Socioeconomic History    Marital status:    Tobacco Use    Smoking status: Never     Passive exposure: Never    Smokeless tobacco: Never   Vaping Use    Vaping status: Never Used   Substance and Sexual Activity    Alcohol use: No    Drug use: No    Sexual activity: Yes     Partners: Male   Other Topics  Concern    Caffeine Concern Yes    Exercise No    Seat Belt Yes              Review of Systems    Positive for stated complaint: kidney stones  Other systems are as noted in HPI.  Constitutional and vital signs reviewed.      All other systems reviewed and negative except as noted above.    Physical Exam     ED Triage Vitals   BP 04/26/24 0806 122/63   Pulse 04/26/24 0806 65   Resp 04/26/24 0806 18   Temp 04/26/24 0806 97.4 °F (36.3 °C)   Temp src 04/26/24 1202 Temporal   SpO2 04/26/24 0922 99 %   O2 Device 04/26/24 0922 None (Room air)       Current:/77   Pulse 71   Temp 97.4 °F (36.3 °C) (Temporal)   Resp 18   Ht 152.4 cm (5')   Wt 57.2 kg   SpO2 96%   BMI 24.61 kg/m²         Physical Exam      Physical Exam  Vitals signs and nursing note reviewed.   General: Patient lying supine in bed in no acute distress  Head: Normocephalic and atraumatic.   HEENT:  Mucous membranes are moist.   Abdominal: Soft, there is left lower quadrant tenderness no guarding or rebound tenderness.  Abdomen is nondistended, normal bowel sounds, no guarding no rebound tenderness  Skin: Warm and dry  Neurological: Awake alert, speech is normal        ED Course     Labs Reviewed   URINALYSIS WITH CULTURE REFLEX - Abnormal; Notable for the following components:       Result Value    Blood Urine Large (*)     Leukocyte Esterase Urine Trace (*)     All other components within normal limits   COMP METABOLIC PANEL (14) - Abnormal; Notable for the following components:    Glucose 103 (*)     Creatinine 1.29 (*)     eGFR-Cr 48 (*)     A/G Ratio 0.9 (*)     All other components within normal limits   UA MICROSCOPIC ONLY, URINE - Abnormal; Notable for the following components:    WBC Urine 6-10 (*)     RBC Urine >10 (*)     Squamous Epi. Cells Few (*)     All other components within normal limits   CBC W/ DIFFERENTIAL - Abnormal; Notable for the following components:    .0 (*)     All other components within normal limits   CBC  WITH DIFFERENTIAL WITH PLATELET    Narrative:     The following orders were created for panel order CBC With Differential With Platelet.  Procedure                               Abnormality         Status                     ---------                               -----------         ------                     CBC W/ DIFFERENTIAL[[id number]]          Abnormal            Final result                 Please view results for these tests on the individual orders.   SCAN SLIDE   RAINBOW DRAW LAVENDER   RAINBOW DRAW LIGHT GREEN   URINE CULTURE, ROUTINE                      MDM    This is a 58-year-old woman history of kidney stones, ulcerative colitis here with left lower quadrant pain.  Has been dealing with similar discomfort over the past 1 and half weeks, was improving now returns.  Differential includes obstructive uropathy, colitis, pyelonephritis.  Will check urinalysis, obtain CT urogram check basic labs provide IV fluids reevaluate.  Patient is declining any pain medication at this time.  CT urogram was obtained shows obstructing stone at left UVJ.  Patient's renal function has changed, now 1.29 from 0.8 baseline.  Patient did receive IV fluids here will continue to push fluids at home.  Her white count is normal her urinalysis does not appear consistent with infection.  I discussed the case with Emily, urology NP, who did recently see the patient in clinic.  She recommends close follow-up in clinic, will restart Flomax, patient understands return to the ER immediately if symptoms worsen or change or if there are any other new concerns, patient as well as  at bedside are in agreement with plan.          I independently viewed and interpreted the following imaging: CT abdomen pelvis with obstructing stone at the left UVJ.                               Medical Decision Making  Problems Addressed:  AN (acute kidney injury) (HCC): acute illness or injury  Obstructive uropathy: acute illness or  injury    Amount and/or Complexity of Data Reviewed  Independent Historian: spouse    Risk  Prescription drug management.        Disposition and Plan     Clinical Impression:  1. Obstructive uropathy    2. AN (acute kidney injury) (HCC)         Disposition:  Discharge  4/26/2024 12:37 pm    Follow-up:  Emily Scott, PAJosephC  [address]  [facility] [address]  [phone]    Call in 1 day(s)  Follow-up with urology within the next 24 to 48 hours return to the ER immediately if symptoms worsen or change or any other new concerns    Tristen Rahman MD  [address] [place] [address] [address]  [facility] [address]  [phone]    Follow up  Follow-up with your PMD for reevaluation in 24 to 48 hours.  Return to ER if symptoms worsen or change or if any other new concerns.          Medications Prescribed:  Discharge Medication List as of 4/26/2024 12:44 PM

## 2024-05-01 NOTE — TELEPHONE ENCOUNTER
This encounter is now closed.     RN called patient to offer an appt 5/2 Wednesday at 11:30 AM. No answer. Left message.     RN also reached out via [employer].

## 2024-05-01 NOTE — TELEPHONE ENCOUNTER
Please see telephone encounter from yesterday. Patient apologizes but cannot make appointment on 5/2, states she has missed a lot of work lately, patient asking if 5/3 or 5/6 is available, preferably beginning or end of day. Please advise thank you.

## 2024-05-02 NOTE — TELEPHONE ENCOUNTER
Patient does not want communication through [employer], patient says she has kidney stones and request a call at [phone],thanks.

## 2024-05-03 NOTE — TELEPHONE ENCOUNTER
This encounter is now closed.     RN called patient. No answer. Left message to call back office or can schedule appt via [employer]. CHAPITO Black no availability on 5/6. [employer] 51/0 Fri at 12:30 noon or 5/14 Tues at 1PM.

## 2024-05-14 NOTE — TELEPHONE ENCOUNTER
Te 5-1-24. Pt went emergency room for a kidney stone.  No sooner appointments available.  Pt transferred to the nurse.

## 2024-05-14 NOTE — PROGRESS NOTES
Urology Clinic Note  Telemedicine Visit  Audio Only  The patient consented to performing this visit virtually.     Primary Care Provider:  @[facility]@     Chief Complaint:   Ureteral stone    HPI:   Patient is a 58 year old female with hx of anxiety, ulcerative colitis, HL, migraines who presents for stone follow up.    Pt last saw Emily on 4/22/24 for kidney stone. CT A/P minimal high density in the distal left ureter at the ureterovesicular junction.  This is difficult to fully delineate.  This may be due to multiple tiny 1-2 mm calculi.  This is causing mild fullness/dilatation of the left renal collecting   system.       CTU was recommended and she stopped flomax. Cytology was done due to ambiguous CT findings, which was neg.     She did go to ED on 4/26/24 for persistent LLQ pain and CTU was completed at this time which showed 3mm left UVJ stone. She began taking flomax again at this time but about to run out.     Currently, denies any pain but has not been straining urine and unsure if she passed stone. Cr in ED 1.29.     Still not pushing much fluids.     History:     Past Medical History:    Abdominal pain    Anxiety state, unspecified    not active    Atypical mole    Bad breath    Bleeding nose    Blood in the stool    Chronic cough    Decorative tattoo    Diarrhea, unspecified    Diverticulitis of colon (without mention of hemorrhage)(562.11)    Dizziness    Fatigue    Flatulence/gas pain/belching    Headache disorder    Heartburn    High cholesterol    Hyperlipidemia    Indigestion    Leg swelling    Lump or mass in breast    Migraine, unspecified, without mention of intractable migraine without mention of status migrainosus    Night sweats    Painful swallowing    Problems with swallowing    Stress    Ulcerative colitis, chronic (HCC)    Unspecified hemorrhoids without mention of complication    Weight gain    Wheezing       Past Surgical History:   Procedure Laterality Date    Colonoscopy      Cyst  aspiration left Left     benign    E[facility]         Family History   Problem Relation Age of Onset    Cancer Mother         breast cancer -     Breast Cancer Mother 67    Diabetes Father     Heart Disorder Father     Hypertension Father     Lipids Father     Stroke Father     Diabetes Sister     Colon Cancer Sister        Social History     Socioeconomic History    Marital status:    Tobacco Use    Smoking status: Never     Passive exposure: Never    Smokeless tobacco: Never   Vaping Use    Vaping status: Never Used   Substance and Sexual Activity    Alcohol use: Yes     Comment: 1-2 drinks once every three months    Drug use: No    Sexual activity: Yes     Partners: Male   Other Topics Concern    Caffeine Concern Yes    Exercise No    Seat Belt Yes       Medications (Active prior to today's visit):  Current Outpatient Medications   Medication Sig Dispense Refill    betamethasone 0.1 % External Cream Apply 1 Application topically in the morning and 1 Application before bedtime. For four weeks. 45 g 0    famotidine 20 MG Oral Tab Take 1 tablet (20 mg total) by mouth nightly as needed for Heartburn. 45 tablet 3    mesalamine (CANASA) 1000 MG Rectal Suppos Place 1 suppository (1,000 mg total) rectally nightly. 90 suppository 3    atorvastatin 20 MG Oral Tab Take 1 tablet (20 mg total) by mouth nightly. 90 tablet 1       Allergies:  No Known Allergies    Review of Systems:   A comprehensive 10-point review of systems was completed.  Pertinent positives and negatives are noted in the the HPI.    Physical Exam:   No physical exam performed during this telephone encounter.    Assessment & Plan:   Ureteral stone  - continue flomax 0.8mg   - repeat BMP and KBUS in 2wks to assess if stone has passed; if ambiguous, will get repeat CT  - push fluids and avoid constipation     In total, 11 minutes were spent on this patient encounter for medical discussion.

## 2024-05-16 NOTE — PROCEDURES
Biopsy Procedure Note    Post-operative Diagnosis: vulvar lesion.    Procedure Details   The risks (including infection, bleeding, pain, and scarring) and benefits of the procedure were explained to the patient and verbal informed consent was obtained.    The patient was noted to have a discolored area on the right vulva just outside the labia minora at the 8 O'clock position.  The area was prepped with betadine solution.  The area was infiltrated with 2 cc of 1% lidocaine solution.  Using the 3 mm punch biopsy, the area was biopsied.  Silver nitrate was applied for hemostasis.      Condition:  Stable    Complications:  None    Plan:  The patient was advised to call for any fever, worsening discharge/redness/swelling, moderate/severe pain or moderate/heavy bleeding. She was advised to use ibuprofen as needed for mild pain.    Switch from betamethasone to hydrocortisone 2.5% twice daily for one month, then 1-2 times per week as needed.    Follow up as needed.    Diagnoses and all orders for this visit:    Lichen sclerosus  -     Specimen to Pathology Tissue; Future    Endocervical polyp  -     Specimen to Pathology Tissue; Future

## 2024-05-16 NOTE — PROCEDURES
LEEP Conization Procedure Note    Post-operative Diagnosis and Indication: Endocervical polyp    Procedure Details:   The risks including infection, bleeding with need for additional procedures, scarring, and incompetent cervix with risks during future pregnancy including  labor/delivery were explained to the patient and verbal informed consent obtained.    Paracervical block performed using 8 ml 1% lidocaine with epinephrine.    Using 48 kaur of blended current, LEEP endocervical polypectomy performed using a 15X12 mm loop. Ball tip cautery and Monsel's solution applied for hemostasis.    Condition:  Stable    Complications:  None    Plan:  Reviewed signs and symptoms of post-procedure complications.

## 2024-07-15 NOTE — TELEPHONE ENCOUNTER
Per 10/10/23 lipid panel:  Cholesterol looks excellent.  Triglycerides off by 4 points but this is okay for not fasting sample     Should pt continue Atorvastatin 20mg?

## 2024-08-14 NOTE — PROGRESS NOTES
CHIEF COMPLAINT:     Chief Complaint   Patient presents with    Eye Problem     Right eye pain,watery ,and discharge. For a week   OTC: none        HPI:   Hermelinda Chen is a 58 year old female who presents with chief complaint of \"pink eye\". Symptoms since 1 week ago.  Symptoms have been worsening since onset.   Patient reports right eye redness, + discharge, + itching, + AM eyelid crusting, no vision change.  This morning eye was more painful and watery.  Difficult looking at lights.   Denies fever, cold symptoms, or contact with irritant.    Treatments tried: cool compresses    Current Outpatient Medications   Medication Sig Dispense Refill    ofloxacin 0.3 % Ophthalmic Solution Place 2 drops into both eyes every 4 (four) hours while awake for 7 days. 10 mL 0    hydrocortisone 2.5 % External Cream Apply 1 Application topically 2 (two) times daily. 28 g 1    tamsulosin 0.4 MG Oral Cap Take 2 capsules (0.8 mg total) by mouth every evening. 180 capsule 0    betamethasone 0.1 % External Cream Apply 1 Application topically in the morning and 1 Application before bedtime. For four weeks. 45 g 0    famotidine 20 MG Oral Tab Take 1 tablet (20 mg total) by mouth nightly as needed for Heartburn. 45 tablet 3    mesalamine (CANASA) 1000 MG Rectal Suppos Place 1 suppository (1,000 mg total) rectally nightly. 90 suppository 3    atorvastatin 20 MG Oral Tab Take 1 tablet (20 mg total) by mouth nightly. 90 tablet 1     No current facility-administered medications for this visit.      Past Medical History:    Abdominal pain    Anxiety state, unspecified    not active    Atypical mole    Bad breath    Bleeding nose    Blood in the stool    Chronic cough    Decorative tattoo    Diarrhea, unspecified    Diverticulitis of colon (without mention of hemorrhage)(562.11)    Dizziness    Fatigue    Flatulence/gas pain/belching    Headache disorder    Heartburn    High cholesterol    Hyperlipidemia    Indigestion    Leg swelling    Lump  or mass in breast    Migraine, unspecified, without mention of intractable migraine without mention of status migrainosus    Night sweats    Painful swallowing    Problems with swallowing    Stress    Ulcerative colitis, chronic (HCC)    Unspecified hemorrhoids without mention of complication    Weight gain    Wheezing      Past Surgical History:   Procedure Laterality Date    Colonoscopy      Cyst aspiration left Left     benign    Egd      Leep N/A 2024    for cervical polyp      Family History   Problem Relation Age of Onset    Cancer Mother         breast cancer -     Breast Cancer Mother 67    Diabetes Father     Heart Disorder Father     Hypertension Father     Lipids Father     Stroke Father     Diabetes Sister     Colon Cancer Sister       Social History     Socioeconomic History    Marital status:    Tobacco Use    Smoking status: Never     Passive exposure: Never    Smokeless tobacco: Never   Vaping Use    Vaping status: Never Used   Substance and Sexual Activity    Alcohol use: Yes     Comment: 1-2 drinks once every three months    Drug use: No    Sexual activity: Yes     Partners: Male   Other Topics Concern    Caffeine Concern Yes    Exercise No    Seat Belt Yes         REVIEW OF SYSTEMS:   GENERAL: denies fever/chills, fatigue.  SKIN: no rashes  EYES:See HPI  HENT: denies ear pain, congestion, sore throat  LUNGS: denies shortness of breath or cough  CARDIOVASCULAR: denies chest pain or palpitations   GI: denies N/V/C or abdominal pain  NEURO: denies headaches     EXAM:   /76   Pulse 66   Temp 97.5 °F (36.4 °C) (Temporal)   Resp 18   Ht 5' (1.524 m)   Wt 132 lb (59.9 kg)   SpO2 98%   BMI 25.78 kg/m²   GENERAL: well developed, well nourished,in no apparent distress  SKIN: no rashes,no suspicious lesions  EYES: PERRLA, EOMI, normal optic disk, right palpebral conjunctiva erythematous, injected, sclera is not erythematous. Watery discharge.  HENT: atraumatic,  normocephalic,ears and throat are clear  NECK: supple, non tender  LUNGS: clear to auscultation bilaterally.   CARDIO: RRR without murmur  LYMPH: no preauricular lymphadenopathy. No cervical lymphadenopathy    ASSESSMENT AND PLAN:   Hermelinda Chen is a 58 year old female who presents with:    ASSESSMENT:   Encounter Diagnosis   Name Primary?    Acute bacterial conjunctivitis of right eye Yes       PLAN: Hygeine and comfort care as listen in patient instructions.  Medication as listed below     Requested Prescriptions     Signed Prescriptions Disp Refills    ofloxacin 0.3 % Ophthalmic Solution 10 mL 0     Sig: Place 2 drops into both eyes every 4 (four) hours while awake for 7 days.         Risks, benefits, complications and side effects of meds discussed.    Patient Instructions   Use eye drops-- 2 drops in each eye every 4 hours for 5-7 days. Use for 3 days past the first clear day.   On Day 1 you may treat the more affected eye every 2 hours.  Resume 4 hour intervals on Days 2-7.  Use cool compresses for comfort as needed.   You may wish to patch the eye after instilling drops to help reduce pain for a period of time.   Take ibuprofen for pain as needed. Make sure to take with food.   You are contagious until you have been on antibiotic treatment for 24 hours so you should limit your contact at school or in public as much as possible.  Avoid touching eyes and wash hands frequently to reduce chance of spreading the infection to others.  Use warm compresses to help reduce swelling and inflammation.   Monitor symptoms and follow-up with ophthalmology if no better in 2-3 days or if pain worsens.

## 2024-08-14 NOTE — PATIENT INSTRUCTIONS
Use eye drops-- 2 drops in each eye every 4 hours for 5-7 days. Use for 3 days past the first clear day.   On Day 1 you may treat the more affected eye every 2 hours.  Resume 4 hour intervals on Days 2-7.  Use cool compresses for comfort as needed.   You may wish to patch the eye after instilling drops to help reduce pain for a period of time.   Take ibuprofen for pain as needed. Make sure to take with food.   You are contagious until you have been on antibiotic treatment for 24 hours so you should limit your contact at school or in public as much as possible.  Avoid touching eyes and wash hands frequently to reduce chance of spreading the infection to others.  Use warm compresses to help reduce swelling and inflammation.   Monitor symptoms and follow-up with ophthalmology if no better in 2-3 days or if pain worsens.

## 2024-10-11 NOTE — H&P
Hermelinda Chen is a 58 year old female who presents for a well woman physical exam:       Patient complains of:    + vit D def.  Not taking vit D.  No fractures.  Mild fatigue.    3. Prediabetes.  Last A1c value was 5.9% done 6/19/2021.     4. Mixed hyperlipidemia.  Stopped the atorvastatin.  LDL used to be like 180.  No CP/SOB.    7. Eye irritation.  Chronic.  Using pataday, polytrim, systane.  Seen by optho.    8. Vaginal odor.  No vaginal discharge.    9. Lichen sclerosus  Had biopsy done.  Given steroid cream, but not really using it.  + itching      Current Outpatient Medications   Medication Sig Dispense Refill    Neomycin-Polymyxin-Dexameth 3.5-88613-9.1 Ophthalmic Suspension 1 application into the lower eyelid of affected eye Ophthalmic Three times a day for 7 days      atorvastatin 20 MG Oral Tab Take 1 tablet (20 mg total) by mouth nightly. 90 tablet 1    nystatin 100,000 Units/g External Cream Apply 1 Application topically 2 (two) times daily. 1 each 2    hydrocortisone 2.5 % External Cream Apply 1 Application topically 2 (two) times daily. 28 g 1    tamsulosin 0.4 MG Oral Cap Take 2 capsules (0.8 mg total) by mouth every evening. 180 capsule 0    betamethasone 0.1 % External Cream Apply 1 Application topically in the morning and 1 Application before bedtime. For four weeks. 45 g 0    famotidine 20 MG Oral Tab Take 1 tablet (20 mg total) by mouth nightly as needed for Heartburn. 45 tablet 3    mesalamine (CANASA) 1000 MG Rectal Suppos Place 1 suppository (1,000 mg total) rectally nightly. 90 suppository 3      Past Medical History:    Abdominal pain    Anxiety state, unspecified    not active    Atypical mole    Bad breath    Bleeding nose    Blood in the stool    Chronic cough    Decorative tattoo    Diarrhea, unspecified    Diverticulitis of colon (without mention of hemorrhage)(562.11)    Dizziness    Fatigue    Flatulence/gas pain/belching    Headache disorder    Heartburn    High cholesterol     Hyperlipidemia    Indigestion    Leg swelling    Lump or mass in breast    Migraine, unspecified, without mention of intractable migraine without mention of status migrainosus    Night sweats    Painful swallowing    Problems with swallowing    Stress    Ulcerative colitis, chronic (HCC)    Unspecified hemorrhoids without mention of complication    Weight gain    Wheezing      Past Surgical History:   Procedure Laterality Date    Colonoscopy      Cyst aspiration left Left     benign    Egd      Leep N/A 2024    for cervical polyp      Family History   Problem Relation Age of Onset    Cancer Mother         breast cancer -     Breast Cancer Mother 67    Diabetes Father     Heart Disorder Father     Hypertension Father     Lipids Father     Stroke Father     Diabetes Sister     Colon Cancer Sister       Social History     Socioeconomic History    Marital status:    Tobacco Use    Smoking status: Never     Passive exposure: Never    Smokeless tobacco: Never   Vaping Use    Vaping status: Never Used   Substance and Sexual Activity    Alcohol use: Yes     Comment: 1-2 drinks once every three months    Drug use: No    Sexual activity: Yes     Partners: Male   Other Topics Concern    Caffeine Concern Yes    Exercise No    Seat Belt Yes        REVIEW OF SYSTEMS:   GENERAL: feels well otherwise  SKIN: denies any unusual skin lesions  EYES:denies blurred vision or double vision  HEENT: denies change in hearing or ear pain; denies change in vision; denies nasal congestion, sinus pain or ST  LUNGS: denies shortness of breath or chronic cough  CV: denies chest pain, pressure or palpitations  GI: denies abdominal pain,denies heartburn  : denies dysuria; denies vaginal discharge.  + irritation; denies dyspareunia; denies dryness  MS: denies back pain  EXT: denies LE edema  NEURO: denies frequent headaches or dizziness  PSYCH: denies change in mood    EXAM:   /70   Pulse 81   Resp 18   Ht 5'  (1.524 m)   Wt 133 lb (60.3 kg)   SpO2 99%   BMI 25.97 kg/m²   Body mass index is 25.97 kg/m².   GENERAL: well developed in no apparent distress  SKIN: no rash,no suspicious lesions  HEENT: atraumatic, normocephalic, TMs clear, nose and throat clear  EYES:PERRLA, EOMI, conjunctiva are clear  NECK: supple,no adenopathy, no thyromegaly  LUNGS: CTA, easy breathing  CV: S1 S2, RRR without murmur  BREASTS: symmetric, no retractions, no dominant or suspicious mass on exam, no axillary adenopathy  GI: good BS's,no masses, HSM or tenderness  : No inguinal adenopathy. + lichen sclerosis genital lesions. Vaginal vault with no discharge. Cervix smooth, no lesions. Bimanual exam unremarkable for adnexal tenderness or enlargement.  RECTAL: normal appearance  MS: Tian, back is not tender  EXT: no edema  NEURO: Oriented times three,cranial nerves are intact,motor and sensory are grossly intact    ASSESSMENT AND PLAN:      1. Routine general medical examination at a health care facility  - TSH W Reflex To Free T4 [E]; Future  - Lipid Panel; Future  - Vitamin D [E]; Future    2. Vitamin D deficiency  - Vitamin D [E]; Future    3. Prediabetes  - Hemoglobin A1C; Future    4. Mixed hyperlipidemia  - atorvastatin 20 MG Oral Tab; Take 1 tablet (20 mg total) by mouth nightly.  Dispense: 90 tablet; Refill: 1    5. Colon cancer screening  - Gastro Referral - In Network    6. Post-menopausal  - XR DEXA BONE DENSITOMETRY (CPT=77080); Future    7. Eye irritation  Follow up with eye doctor.    8. Vaginal odor  - nystatin 100,000 Units/g External Cream; Apply 1 Application topically 2 (two) times daily.  Dispense: 1 each; Refill: 2  - Vaginitis Vaginosis PCR Panel; Future  - Vaginitis Vaginosis PCR Panel    9. Lichen sclerosus  Steroid cream as previously prescribed by gyn. Do one week on then 1 week off.    Meds & Refills for this Visit:  Requested Prescriptions     Signed Prescriptions Disp Refills    atorvastatin 20 MG Oral Tab 90 tablet  1     Sig: Take 1 tablet (20 mg total) by mouth nightly.    nystatin 100,000 Units/g External Cream 1 each 2     Sig: Apply 1 Application topically 2 (two) times daily.     Hermelinda was given an opportunity to ask questions and verbalized understanding of care.  Follow up 3 months.

## 2025-01-23 NOTE — PROGRESS NOTES
Subjective:   Patient ID: Hermelinda Chen is a 59 year old female.    HPI  Patient presents for evaluation of low back pain  Ongoing 1-2 weeks but getting worse  Constant pain  Hard to get comfortable and cannot sit for very long in one position  Worse with movement and weight bearing  Located right low back and radiating to right buttock an leg  Feels better with laying down and in the morning  As soon as she starts walking it hurts again  Having hard time with ADLs  She has some right leg numbness  No limb weakness  Tried tylenol but no relief  She has been applying a heating pad but not helping much  Never had this severe of back pain before  She has h/o renal stones but this feels different  No trigger, no trauma or inciting event      History/Other:   Review of Systems   Constitutional:  Negative for chills and diaphoresis.   Respiratory:  Negative for cough, shortness of breath and wheezing.    Cardiovascular:  Negative for palpitations.   Gastrointestinal:  Negative for diarrhea, nausea and vomiting.   Genitourinary:  Negative for flank pain, frequency, hematuria and urgency.   Musculoskeletal:  Positive for arthralgias, back pain, gait problem and myalgias. Negative for neck pain.   Psychiatric/Behavioral:  Negative for sleep disturbance.      Current Outpatient Medications   Medication Sig Dispense Refill    Neomycin-Polymyxin-Dexameth 3.5-15989-2.1 Ophthalmic Suspension 1 application into the lower eyelid of affected eye Ophthalmic Three times a day for 7 days      atorvastatin 20 MG Oral Tab Take 1 tablet (20 mg total) by mouth nightly. 90 tablet 1    nystatin 100,000 Units/g External Cream Apply 1 Application topically 2 (two) times daily. 1 each 2    hydrocortisone 2.5 % External Cream Apply 1 Application topically 2 (two) times daily. 28 g 1    betamethasone 0.1 % External Cream Apply 1 Application topically in the morning and 1 Application before bedtime. For four weeks. 45 g 0    mesalamine  (CANASA) 1000 MG Rectal Suppos Place 1 suppository (1,000 mg total) rectally nightly. 90 suppository 3     Allergies:Allergies[1]    Objective:   Physical Exam  Vitals and nursing note reviewed.   Constitutional:       Appearance: She is well-developed.   Cardiovascular:      Rate and Rhythm: Normal rate and regular rhythm.      Heart sounds: Normal heart sounds.   Pulmonary:      Effort: Pulmonary effort is normal.      Breath sounds: Normal breath sounds. No wheezing or rales.   Musculoskeletal:      Thoracic back: Spasms, tenderness and bony tenderness present. Decreased range of motion.      Lumbar back: Spasms, tenderness and bony tenderness present. Decreased range of motion. Positive right straight leg raise test and positive left straight leg raise test.   Skin:     General: Skin is warm and dry.   Neurological:      Mental Status: She is alert.      Sensory: No sensory deficit.         Assessment & Plan:   1. Bilateral low back pain with bilateral sciatica, unspecified chronicity  2. Spasm of muscle of lower back  Treat with steroids and muscle relaxer as directed. Medication use and side effects discussed. Apply heat/ice as needed, stretch gently, consider using topical balm/patches. Follow up if not soon better or if symptoms worsen.   - methylPREDNISolone (MEDROL) 4 MG Oral Tablet Therapy Pack; As directed.  Dispense: 21 tablet; Refill: 0  - methocarbamol 500 MG Oral Tab; Take 1 tablet (500 mg total) by mouth 4 (four) times daily.  Dispense: 30 tablet; Refill: 0               [1] No Known Allergies

## 2025-02-10 NOTE — TELEPHONE ENCOUNTER
Yes, both are okay short term but only if she is not taking mesalamine. If she is taking mesalamine then neither of these can be used and I would focus on liquids and bland diet.

## 2025-02-10 NOTE — TELEPHONE ENCOUNTER
Spoke to pt, recently seen for sciatica.  Finished medrol dos edward, pain is better, however now having soreness below breast in center of abdomen.  Yesterday had diarrhea x1- no visible blood, no BM today. Denies fever. Denies nausea/vomiting.  Unsure if related to her sciatica pain.  Any suggestions?

## 2025-02-10 NOTE — TELEPHONE ENCOUNTER
Unlikely to be related to back/sciatica. Could be gastritis or something viral. I recommend otc pepto bismol or prilosec and bland diet, clear liquids. Follow up in office if worsening.

## 2025-02-10 NOTE — TELEPHONE ENCOUNTER
Patient calling to update that the medications Waldemar prescribed on 1/23 seem to be helping with her back pain, but not states she has had stomach pain (center above belly button) consistently for a week

## 2025-02-24 NOTE — TELEPHONE ENCOUNTER
Spoke to pt, states had back pain and was given steroid on 1/23/25, states back pain better but now having achy abdominal pain.  Had BM this morning. States had some pain. Denies diarrhea, has hx of kidney stones and collitis.  Advised pt to go to ER for exam. Pt agrees and will proceed.

## 2025-02-24 NOTE — TELEPHONE ENCOUNTER
Pt has colitis.  She saw LE a few weeks ago for lower back pain.  She had a kidney stones awhile ago and doesn't know if they past.  She is having a lot of lower back pain and stomach pain.  She had a bowel movement today and it \"hurts down there\".  She doesn't see Waldemar until March.

## 2025-02-24 NOTE — ED INITIAL ASSESSMENT (HPI)
PT PRESENTS TO ED WITH MULTIPLE COMPLAINTS.  PT STATES SHE HAS A HEADACHE, FLANK PAIN THAT RADIATES TO ABDOMEN.  PT STATES IT IS RIGHT SIDED, DENIES URINARY SYMPTOMS.  STATES SHE HAS HAD BACK PAIN FOR MONTHS

## 2025-02-24 NOTE — ED PROVIDER NOTES
Patient Seen in: [facility] Emergency Department      History     Chief Complaint   Patient presents with    Abdomen/Flank Pain     Stated Complaint: flank pain    Subjective:     HPI    59-year-old woman with anxiety, migraine headaches who reports a long-standing back problem and now experiences right upper quadrant abdominal tenderness. She describes the pain as aching and notes that her abdomen feels bloated. The pain worsens after eating.     Objective:   Past Medical History:    Abdominal pain    Anxiety state, unspecified    not active    Atypical mole    Bad breath    Bleeding nose    Blood in the stool    Chronic cough    Decorative tattoo    Diarrhea, unspecified    Diverticulitis of colon (without mention of hemorrhage)(562.11)    Dizziness    Fatigue    Flatulence/gas pain/belching    Headache disorder    Heartburn    High cholesterol    Hyperlipidemia    Indigestion    Leg swelling    Lump or mass in breast    Migraine, unspecified, without mention of intractable migraine without mention of status migrainosus    Night sweats    Painful swallowing    Problems with swallowing    Stress    Ulcerative colitis, chronic (HCC)    Unspecified hemorrhoids without mention of complication    Weight gain    Wheezing              Past Surgical History:   Procedure Laterality Date    Colonoscopy      Cyst aspiration left Left 2012    benign    Egd      Leep N/A 05/16/2024    for cervical polyp                Social History     Socioeconomic History    Marital status:    Tobacco Use    Smoking status: Never     Passive exposure: Never    Smokeless tobacco: Never   Vaping Use    Vaping status: Never Used   Substance and Sexual Activity    Alcohol use: Yes     Comment: 1-2 drinks once every three months    Drug use: No    Sexual activity: Yes     Partners: Male   Other Topics Concern    Caffeine Concern Yes    Exercise No    Seat Belt Yes              Review of Systems    Positive for stated complaint:  flank pain  Other systems are as noted in HPI.  Constitutional and vital signs reviewed.      All other systems reviewed and negative except as noted above.    Physical Exam     ED Triage Vitals [02/24/25 1106]   /74   Pulse 73   Resp 16   Temp 98 °F (36.7 °C)   Temp src Temporal   SpO2 97 %   O2 Device None (Room air)       Current:/78   Pulse 78   Temp 98 °F (36.7 °C) (Temporal)   Resp 16   Ht 152.4 cm (5')   Wt 59.9 kg   SpO2 96%   BMI 25.78 kg/m²       General:  Vitals as listed.  No acute distress   HEENT: Sclerae anicteric.  Conjunctivae show no pallor.  Oropharynx clear, mucous membranes moist   Lungs: good air exchange  Abdomen: Right upper quadrant abdominal tenderness.  No abdominal masses.  No peritoneal signs   Extremities: no edema, normal peripheral pulses   Neuro: Alert oriented and nonfocal      ED Course     Labs Reviewed   COMP METABOLIC PANEL (14) - Abnormal; Notable for the following components:       Result Value    Glucose 100 (*)     All other components within normal limits   URINALYSIS WITH CULTURE REFLEX - Abnormal; Notable for the following components:    Spec Gravity >1.030 (*)     Blood Urine 1+ (*)     Protein Urine Trace (*)     Leukocyte Esterase Urine 250 (*)     WBC Urine >50 (*)     RBC Urine 3-5 (*)     All other components within normal limits   LIPASE - Normal   CBC WITH DIFFERENTIAL WITH PLATELET   URINE CULTURE, ROUTINE     ED COURSE and MDM     Differential diagnosis before testing included UTI/pyelonephritis versus severe sepsis, a medical condition that poses a threat to life.    I reviewed prior external notes including a CT urogram done 4/26/2024 that showed mildly obstructing UVJ stone, 3 mm, on the left.    Given ceftriaxone for UTI.  Will prescribe cefpodoxime for 10-day course.    I have discussed with the patient the results of testing, differential diagnosis, and treatment plan. They expressed clear understanding of these instructions and agrees  to the plan provided.    Disposition and Plan     Clinical Impression:  1. Pyelonephritis         Disposition:  Discharge  2/24/2025  2:27 pm    Follow-up:  Mary Jo Patton DO  [address] [place] [id number] [phone]    Schedule an appointment as soon as possible for a visit in 3 day(s)  As needed        Medications Prescribed:  Current Discharge Medication List        START taking these medications    Details   cefpodoxime 200 MG Oral Tab Take 1 tablet (200 mg total) by mouth 2 (two) times daily for 10 days.  Qty: 20 tablet, Refills: 0

## 2025-02-26 NOTE — TELEPHONE ENCOUNTER
Reviewed lab report with patient.  States she has concern in regard to abdominal US.  Fatty liver. She does not drink alcohol. She has scheduled OV on 03/17/25 with Massimo GILMAN and  will discuss more in depth at time of visit. She is presently on antibiotic. Recommended she also try probiotic.

## 2025-02-27 NOTE — TELEPHONE ENCOUNTER
Noted, will discuss at appt, thank you. Causes of this may include weight gain, insulin resistance, high fat diet, genetics, having recently taken steroids and/or tylenol/acetaminophen products. Liver enzymes have been in normal range which is reassuring.

## 2025-03-17 NOTE — PROGRESS NOTES
The following individual(s) verbally consented to be recorded using ambient AI listening technology and understand that they can each withdraw their consent to this listening technology at any point by asking the clinician to turn off or pause the recording:    Patient name: Hermelinda Mazariegos April

## 2025-03-17 NOTE — PROGRESS NOTES
Subjective:   Hermelinda Chen is a 59 year old female who presents for Follow - Up (Pt.is here for ER follow up for stomach pain and back pain and cramps.)       History/Other:   History of Present Illness  Mrs. Hermelinda Chen is a 59 year old female with back pain and colitis who presents with persistent back and abdominal pain.    She experiences persistent back pain, described as soreness on the right side, which intensifies with position changes such as standing up or getting out of bed. The pain feels like a contraction that won't relax. Although not as severe as initially, it still impacts her daily activities. She attempts to move more frequently at work to alleviate discomfort. Previous treatments with a muscle relaxer and steroid pack provided some relief, but the pain has not completely resolved.    She also reports persistent abdominal pain, primarily in the right lower quadrant, described as 'smooshy'. She visited the ER, where an ultrasound was performed, and she was treated for a suspected kidney infection with ceftriaxone and cefpodoxime, which she took for 8 days but did not complete due to stomach discomfort. No nausea, maintains a normal appetite but experiences bloating and increased gas. She has a history of colitis and is due for a colonoscopy.    Her past medical history includes colitis and acid reflux. She experiences acid reflux symptoms after eating, requiring frequent throat clearing. A previous EGD noted a hiatal hernia and gastritis. No current urinary symptoms such as burning or frequency.    She has a history of kidney stones, last noted to be 3 mm in size, which likely passed on her own.   Chief Complaint Reviewed and Verified  Nursing Notes Reviewed and   Verified  Allergies Reviewed  Medications Reviewed         Tobacco:  She has never smoked tobacco.    Current Outpatient Medications   Medication Sig Dispense Refill    methocarbamol 500 MG Oral Tab Take 1 tablet (500 mg  total) by mouth 4 (four) times daily. 30 tablet 0    methylPREDNISolone (MEDROL) 4 MG Oral Tablet Therapy Pack As directed. 21 tablet 0    pantoprazole 40 MG Oral Tab EC Take 1 tablet (40 mg total) by mouth every morning before breakfast. 30 tablet 0    Neomycin-Polymyxin-Dexameth 3.5-59955-5.1 Ophthalmic Suspension 1 application into the lower eyelid of affected eye Ophthalmic Three times a day for 7 days      atorvastatin 20 MG Oral Tab Take 1 tablet (20 mg total) by mouth nightly. 90 tablet 1    nystatin 100,000 Units/g External Cream Apply 1 Application topically 2 (two) times daily. 1 each 2    hydrocortisone 2.5 % External Cream Apply 1 Application topically 2 (two) times daily. 28 g 1    betamethasone 0.1 % External Cream Apply 1 Application topically in the morning and 1 Application before bedtime. For four weeks. 45 g 0    mesalamine (CANASA) 1000 MG Rectal Suppos Place 1 suppository (1,000 mg total) rectally nightly. (Patient not taking: Reported on 3/17/2025) 90 suppository 3     Review of Systems:  Pertinent items are noted in HPI.  Constitutional: negative for chills and fevers  Gastrointestinal: positive for abdominal pain and dyspepsia  Genitourinary:negative for dysuria, frequency, and hematuria  Musculoskeletal:positive for back pain and myalgias  Neurological: positive for paresthesia      Objective:   /82   Pulse 68   Resp 16   Ht 5' (1.524 m)   Wt 134 lb (60.8 kg)   SpO2 95%   BMI 26.17 kg/m²  Estimated body mass index is 26.17 kg/m² as calculated from the following:    Height as of this encounter: 5' (1.524 m).    Weight as of this encounter: 134 lb (60.8 kg).  Results  LABS  Urinalysis: Elevated WBCs, minimal RBCs (03/17/2025)    RADIOLOGY  Abdominal Ultrasound: Fatty liver, normal gallbladder, normal pancreas, normal spleen, normal kidneys, no stones (03/17/2025)    DIAGNOSTIC  EGD: Hiatal hernia, gastritis (2023)    PATHOLOGY  EGD Biopsy: No H. pylori (2023)     Physical  Exam  CHEST: Clear to auscultation bilaterally. No wheezes, rhonchi, or crackles.  ABDOMEN: Tenderness in the upper abdomen. Abdomen distended. Negative Gonzalez's sign. No costovertebral angle tenderness bilaterally.  MUSCULOSKELETAL: Muscle spasm on right side of back.      Assessment & Plan:   1. Chronic right-sided low back pain without sciatica (Primary)  -     Methocarbamol; Take 1 tablet (500 mg total) by mouth 4 (four) times daily.  Dispense: 30 tablet; Refill: 0  -     methylPREDNISolone; As directed.  Dispense: 21 tablet; Refill: 0  2. Spasm of muscle of lower back  -     Methocarbamol; Take 1 tablet (500 mg total) by mouth 4 (four) times daily.  Dispense: 30 tablet; Refill: 0  -     methylPREDNISolone; As directed.  Dispense: 21 tablet; Refill: 0  3. Pain of upper abdomen  -     CT ABDOMEN+PELVIS(ALL W+WO)(CPT=74178); Future; Expected date: 03/17/2025  -     Pantoprazole Sodium; Take 1 tablet (40 mg total) by mouth every morning before breakfast.  Dispense: 30 tablet; Refill: 0    Assessment & Plan  Musculoskeletal back pain  Chronic right-sided musculoskeletal back pain likely due to muscle spasm and strain. Previous treatments provided some relief. Discussed dry needling as an alternative treatment.  - Prescribe Medrol Dosepak and muscle relaxer.  - Advise use of heat and over-the-counter patches for comfort.  - Encourage walking and hydration.  - Discuss potential for dry needling or physical therapy if symptoms persist.    Abdominal pain  Persistent right-sided abdominal pain with bloating and tenderness. Differential includes gastroenteritis, colitis flare, or gastritis. Previous imaging showed no stones or significant abnormalities. Discussed CT scan with contrast to evaluate abdominal structures.  - Order CT scan with contrast to evaluate abdominal structures.  - Consider treatment options for gastritis and bloating if symptoms persist.    Acid reflux  Chronic acid reflux with postprandial throat  clearing and possible gastritis. Hiatal hernia and previous EGD showed gastritis and inflammation. Discussed potential postnasal drip contribution.  - Consider medications to manage acid reflux and gastritis.  - Advise dietary modifications to reduce reflux symptoms.    Follow-up  Plans to travel to [place] on April 5th. Discussed managing symptoms during travel and the option for diagnostic imaging before or after the trip.  - Order CT scan with contrast before April 5th if symptoms persist.  - Provide anticipatory guidance for managing symptoms during travel, including medication and hydration.    Recording duration: 28 minutes        No follow-ups on file.        Waldemar Sherman PA-C, 3/17/2025, 4:10 PM

## 2025-04-25 NOTE — PROGRESS NOTES
Subjective:   Patient ID: Hermelinda Chen is a 59 year old female.  Complains of Blotting and abdominal pain all over not specified.  + Chronic Fatigue started a week ago, + sweating in the night   Complains of right side low backache, during the movement, without tenderness,  radiating to right leg.  Complains of colitis.            History/Other:   Review of Systems   All other systems reviewed and are negative.    Current Medications[1]  Allergies:Allergies[2]    Objective:   Physical Exam  Constitutional:       Appearance: She is well-developed.   Cardiovascular:      Rate and Rhythm: Normal rate and regular rhythm.      Heart sounds: Normal heart sounds.   Pulmonary:      Effort: Pulmonary effort is normal.      Breath sounds: Normal breath sounds.   Abdominal:      Tenderness: There is abdominal tenderness.   Musculoskeletal:      Comments: Right low back non tender   + radiating pain to the rt leg    Skin:     General: Skin is warm and dry.   Neurological:      Mental Status: She is alert.      Deep Tendon Reflexes: Reflexes are normal and symmetric.       Assessment & Plan:   1. Chronic abdominal pain    2. Fatigue, unspecified type    3. Chronic right-sided low back pain with right-sided sciatica    4. Microscopic hematuria    1. Chronic abdominal pain  - pantoprazole 40 MG Oral Tab EC; Twice daily x 10 days then once daily x 10 days  Dispense: 30 tablet; Refill: 0  - CBC W Differential W Platelet [E]; Future  - Comp Metabolic Panel (14) [E]; Future  - ondansetron (ZOFRAN) 8 MG tablet; Take 1 tablet (8 mg total) by mouth daily as needed for Nausea.  Dispense: 3 tablet; Refill: 0  - CELIAC DISEASE SCREEN Reflex [E]; Future    2. Fatigue, unspecified type  - CBC W Differential W Platelet [E]; Future  - Comp Metabolic Panel (14) [E]; Future  - CELIAC DISEASE SCREEN Reflex [E]; Future  - Vitamin D [E]; Future    3. Chronic right-sided low back pain with right-sided sciatica  - OP REFERRAL TO [facility]    4. Microscopic hematuria  - UA/M With Culture Reflex [E]; Future      Orders Placed This Encounter   Procedures    CBC W Differential W Platelet [E]    Comp Metabolic Panel (14) [E]    CELIAC DISEASE SCREEN Reflex [E]    Vitamin D [E]    UA/M With Culture Reflex [E]       Meds This Visit:  Requested Prescriptions     Signed Prescriptions Disp Refills    pantoprazole 40 MG Oral Tab EC 30 tablet 0     Sig: Twice daily x 10 days then once daily x 10 days    ondansetron (ZOFRAN) 8 MG tablet 3 tablet 0     Sig: Take 1 tablet (8 mg total) by mouth daily as needed for Nausea.       Imaging & Referrals:  OP REFERRAL TO [facility]         [1]   Current Outpatient Medications   Medication Sig Dispense Refill    pantoprazole 40 MG Oral Tab EC Twice daily x 10 days then once daily x 10 days 30 tablet 0    ondansetron (ZOFRAN) 8 MG tablet Take 1 tablet (8 mg total) by mouth daily as needed for Nausea. 3 tablet 0    Neomycin-Polymyxin-Dexameth 3.5-43012-5.1 Ophthalmic Suspension 1 application into the lower eyelid of affected eye Ophthalmic Three times a day for 7 days      atorvastatin 20 MG Oral Tab Take 1 tablet (20 mg total) by mouth nightly. 90 tablet 1    nystatin 100,000 Units/g External Cream Apply 1 Application topically 2 (two) times daily. (Patient taking differently: Apply 1 Application topically as needed.) 1 each 2    hydrocortisone 2.5 % External Cream Apply 1 Application topically 2 (two) times daily. (Patient taking differently: Apply 1 Application topically as needed.) 28 g 1    betamethasone 0.1 % External Cream Apply 1 Application topically in the morning and 1 Application before bedtime. For four weeks. (Patient taking differently: Apply 1 Application topically as needed. For four weeks) 45 g 0    methocarbamol 500 MG Oral Tab Take 1 tablet (500 mg total) by mouth 4 (four) times daily. (Patient not taking: Reported on 4/25/2025) 30 tablet 0    mesalamine (CANASA) 1000  MG Rectal Suppos Place 1 suppository (1,000 mg total) rectally nightly. (Patient not taking: Reported on 3/17/2025) 90 suppository 3   [2] No Known Allergies

## 2025-04-29 NOTE — TELEPHONE ENCOUNTER
Pt read CT scan and MyChart message.  Pt asking if pain is from hernia (stomach pain) or back?  Can you comment on these concerns from CT results?    Pt still having stomach pain and back pain, will continue pantoprazole      Please advise

## 2025-04-30 NOTE — TELEPHONE ENCOUNTER
For back I referred her to PT.  Nothing on CT about back or hernia?  If abdominal pain not better in 3 weeks then see GI, refer Dr. Vallecillo.

## 2025-04-30 NOTE — TELEPHONE ENCOUNTER
Patient informed and verbalized understanding.  Patient states she has GI Dr and will call us back with name for referral.    Also in regard to vitamin D result patient advises she is currently not taking any Vitamin D.Please advise how much vitamin D you would like patient to take?  Send message with how much vitamin D via my-chart.

## 2025-05-01 NOTE — TELEPHONE ENCOUNTER
CT shows:  Kidneys are symmetrical in size without evidence of hydronephrosis.  Tiny nonobstructing left renal calculus.  Left extrarenal pelvis.     Okay to take calcium supplement still?

## (undated) NOTE — LETTER
04/03/23    RE: Starling Forget April          To Whom It May Concern:      Pt is being treated by me for bronchitis with bronchospasm. I recommend that she work from home this week until her cough is better controlled. Thank you for your understanding.         [id number] [address]

## (undated) NOTE — LETTER
08/14/24    Patient Name:  Hermelinda Chen        To Whom it may concern:    Hermelinda Chen was evaluated in the office today for pink eye.  She is contagious for 24 hours and should work from home today, but may return to the office after 24 hours of treatment have been completed.       Sincerely,     Anika Jay PA-C

## (undated) NOTE — LETTER
08/30/21        [address] [facility]       Dear Theresa Half,    Our records indicate that you have outstanding lab work and or testing that was ordered for you and has not yet been completed:  Orders Placed This Encounter

## (undated) NOTE — LETTER
Hermelinda Chen, :1965    CONSENT FOR PROCEDURE/SEDATION    1. I authorize the performance upon Hermelinda Chen  the following: Loop Electrosurgical Excision Procedure/Excision, Polypectomy and Vulvar Biopsy.    2. I authorize Dr. Noah Chairez MD (and whomever is designated as the doctor’s assistant), to perform the above-mentioned procedures.    3. If any unforeseen conditions arise during this procedure calling for additional  procedures, operations, or medications (including anesthesia and blood transfusion), I further request and authorize the doctor to do whatever he/she deems advisable in my interest.    4. I consent to the taking and reproduction of any photographs in the course of this procedure for professional purposes.    5. I consent to the administration of such sedation as may be considered necessary or advisable by the physician responsible for this service, with the exception of ______________________________________________________    6. I have been informed by my doctor of the nature and purpose of this procedure sedation, possible alternative methods of treatment, risk involved and possible complications.    7. If I have a Do Not Resuscitate (DNR) order in place, the physician and I (or the individual authorized to consent on my behalf) will discuss and agree as to whether the Do Not Resuscitate (DNR) order will remain in effect or will be discontinued during the performance of the procedure and the applicable recovery period. If the Do Not Resuscitate (DNR) order is discontinued and is to be reinstated following the procedure/recovery period, the physician will determine when the applicable recovery period ends for purposes of reinstating the Do Not Resuscitate (DNR) order.    Signature of Patient:_______________________________________________    Signature of person authorized to consent for patient:  _______________________________________________________________    Relationship to  patient: ____________________________________________    Witness: _________________________________________ Date:___________     Physician Signature: _______________________________ Date:___________

## (undated) NOTE — LETTER
Date & Time: 6/6/2023, 9:23 AM  Patient: Sherice Rios  Encounter Provider(s):    GONSALO Tavares       To Whom It May Concern:    Sherice Rios was seen and treated in our department on 6/5/2023. She is able to work from home and return to the office on 6/12/23. If you have any questions or concerns, please do not hesitate to call.         RefGONSALO Sharp

## (undated) NOTE — LETTER
Date & Time: 12/14/2023, 8:42 AM  Patient: Yodit Caldwell  Encounter Provider(s): Mayo Zavala MD       To Whom It May Concern:    Charbel Mccall was seen and treated in our department on 12/14/2023. She should not return to work until 12/20/2023 .     If you have any questions or concerns, please do not hesitate to call.        _____________________________  Physician/APC Signature

## (undated) NOTE — MR AVS SNAPSHOT
After Visit Summary   5/16/2024    Hermelinda Chen   MRN: [id number]           Visit Information     Date & Time  5/16/2024  3:00 PM Provider  Noah Chairez MD [facility], [address] [place] [address] - OB/GYN Dept. Phone  [phone]      Your Vitals Were  Most recent update: 5/16/2024  3:07 PM    BP   144/68    Pulse   86    Wt   130 lb    BMI   25.39 kg/m²          Allergies as of 5/16/2024  Review status set to Review Complete on 5/16/2024   No Known Allergies     Your Current Medications        Dosage    hydrocortisone 2.5 % External Cream Apply 1 Application topically 2 (two) times daily.    tamsulosin 0.4 MG Oral Cap Take 2 capsules (0.8 mg total) by mouth every evening.    betamethasone 0.1 % External Cream Apply 1 Application topically in the morning and 1 Application before bedtime. For four weeks.    famotidine 20 MG Oral Tab Take 1 tablet (20 mg total) by mouth nightly as needed for Heartburn.    mesalamine (CANASA) 1000 MG Rectal Suppos Place 1 suppository (1,000 mg total) rectally nightly.    atorvastatin 20 MG Oral Tab Take 1 tablet (20 mg total) by mouth nightly.      Diagnoses for This Visit    Lichen sclerosus   [515430]  -  Primary  Endocervical polyp   [644243]             We Ordered the Following     Normal Orders This Visit    Specimen to Pathology Tissue [[id number] CUSTOM]     Future Labs/Procedures Expected by Expires    Specimen to Pathology Tissue [[id number] CUSTOM]  5/16/2024 5/16/2025    Specimen to Pathology Tissue [[id number] CUSTOM]  5/16/2024 5/16/2025      Future Appointments        Provider Department    6/24/2024 12:30 PM Yovany Sorenson [facility]    8/19/2024 8:00 AM Yovany Sorenson [facility],  [facility]                Did you know that [facility] primary care physicians now offer Video Visits through [employer] for adult patients for a variety of conditions such as allergies, back pain and cold symptoms? Skip the drive and waiting room and  online chat with a doctor face-to-face using your web-cam enabled computer or mobile device wherever you are. Video Visits cost $50 and can be paid hassle-free using a credit, debit, or health savings card.  Not active on [employer]? Ask us how to get signed up today!          If you receive a survey from Ruthann Esquivel, please take a few minutes to complete it and provide feedback. We strive to deliver the best patient experience and are looking for ways to make improvements. Your feedback will help us do so. For more information on Ruthann Esquivel, please visit [email or web address]           No text in SmartText           No text in SmartText